# Patient Record
Sex: MALE | Race: WHITE | URBAN - METROPOLITAN AREA
[De-identification: names, ages, dates, MRNs, and addresses within clinical notes are randomized per-mention and may not be internally consistent; named-entity substitution may affect disease eponyms.]

---

## 2017-01-06 ENCOUNTER — OFFICE VISIT (OUTPATIENT)
Dept: PAIN MANAGEMENT | Age: 67
End: 2017-01-06

## 2017-01-06 VITALS — DIASTOLIC BLOOD PRESSURE: 52 MMHG | SYSTOLIC BLOOD PRESSURE: 81 MMHG | HEART RATE: 62 BPM | RESPIRATION RATE: 17 BRPM

## 2017-01-06 DIAGNOSIS — S32.009A CLOSED FRACTURE OF LUMBAR VERTEBRA, UNSPECIFIED FRACTURE MORPHOLOGY, INITIAL ENCOUNTER (HCC): ICD-10-CM

## 2017-01-06 DIAGNOSIS — M25.50 POLYARTHRALGIA: ICD-10-CM

## 2017-01-06 DIAGNOSIS — G89.29 CHRONIC BILATERAL LOW BACK PAIN WITH SCIATICA, SCIATICA LATERALITY UNSPECIFIED: Primary | ICD-10-CM

## 2017-01-06 DIAGNOSIS — M54.40 CHRONIC BILATERAL LOW BACK PAIN WITH SCIATICA, SCIATICA LATERALITY UNSPECIFIED: Primary | ICD-10-CM

## 2017-01-06 DIAGNOSIS — M15.9 GENERALIZED OA: ICD-10-CM

## 2017-01-06 DIAGNOSIS — M79.2 NEUROPATHIC PAIN: ICD-10-CM

## 2017-01-06 RX ORDER — HYDROCODONE BITARTRATE AND ACETAMINOPHEN 10; 325 MG/1; MG/1
.5-1 TABLET ORAL
Qty: 30 TAB | Refills: 0 | Status: SHIPPED | OUTPATIENT
Start: 2017-02-24 | End: 2017-03-02 | Stop reason: SDUPTHER

## 2017-01-06 RX ORDER — HYDROCODONE BITARTRATE AND ACETAMINOPHEN 10; 325 MG/1; MG/1
.5-1 TABLET ORAL
Qty: 30 TAB | Refills: 0 | Status: SHIPPED | OUTPATIENT
Start: 2017-01-06 | End: 2017-01-06 | Stop reason: SDUPTHER

## 2017-01-06 NOTE — PATIENT INSTRUCTIONS
1. Continue current plan with no evidence of addiction or diversion. Stable on current medication without adverse events. 2. Refill hydrocodone 10/325 mg. Take 1/2 to 1 tablet once daily as needed for pain. Currently has unfilled prescription dated to fill 12/29. He will have this filled on 1/25. New prescription written for 2/24  3. Continue tramadol 50 mg. Take 1-2 tablets up to 3 times daily as needed for pain. Currently has 2 refills left  4. Discussed risks of addiction, dependency, and opioid induced hyperalgesia.    5. Return to clinic in 2 months

## 2017-01-06 NOTE — MR AVS SNAPSHOT
Visit Information Date & Time Provider Department Dept. Phone Encounter #  
 1/6/2017  8:20 AM [facility] [phone] [id number] Follow-up Instructions Return in about 2 months (around 3/6/2017). Upcoming Health Maintenance Date Due Hepatitis C Screening 1950 DTaP/Tdap/Td series (1 - Tdap) 7/10/1970 FOBT Q 1 YEAR AGE 50-75 7/10/1999 ZOSTER VACCINE AGE 60> 7/10/2009 GLAUCOMA SCREENING Q2Y 7/10/2014 Pneumococcal 65+ Low/Medium Risk (1 of 2 - PCV13) 7/10/2014 MEDICARE YEARLY EXAM 7/10/2014 INFLUENZA AGE 9 TO ADULT 8/1/2016 Allergies as of 1/6/2017  Review Complete On: 1/6/2017 By: VENKATESH Gibbons No Known Allergies Current Immunizations  Never Reviewed No immunizations on file. Not reviewed this visit You Were Diagnosed With   
  
 Codes Comments Chronic bilateral low back pain with sciatica, sciatica laterality unspecified    -  Primary ICD-10-CM: M54.40, G89.29 ICD-9-CM: 724.2, 724.3, 338.29 Neuropathic pain     ICD-10-CM: M79.2 ICD-9-CM: 729.2 Polyarthralgia     ICD-10-CM: M25.50 ICD-9-CM: 719.49 Closed fracture of lumbar vertebra, unspecified fracture morphology, initial encounter ([facility]     ICD-10-CM: Z67.945Y ICD-9-CM: 805.4 Generalized OA     ICD-10-CM: M15.9 ICD-9-CM: 715.00 Vitals BP Pulse Resp Smoking Status (!) 81/52 62 17 Current Every Day Smoker Vitals History Preferred Pharmacy Pharmacy Name Phone [facility] [address], [address] [phone] Your Updated Medication List  
  
   
This list is accurate as of: 1/6/17  8:58 AM.  Always use your most recent med list.  
  
  
  
  
 amitriptyline 50 mg tablet Commonly known as:  ELAVIL Take  by mouth nightly. aspirin 81 mg chewable tablet Take 81 mg by mouth daily. cyanocobalamin 500 mcg tablet Commonly known as:  VITAMIN B12 Take 500 mcg by mouth daily. diclofenac 1 % Gel Commonly known as:  VOLTAREN Apply  to affected area four (4) times daily. gabapentin 100 mg capsule Commonly known as:  NEURONTIN Take  by mouth three (3) times daily. HYDROcodone-acetaminophen  mg tablet Commonly known as:  Kemal Barnett Take 0.5-1 Tabs by mouth daily as needed for Pain for up to 30 days. Start taking on:  2/24/2017  
  
 ibuprofen 800 mg tablet Commonly known as:  MOTRIN Take  by mouth every eight (8) hours as needed for Pain. lisinopril 10 mg tablet Commonly known as:  Marly Shames Take  by mouth daily. omeprazole 20 mg capsule Commonly known as:  PRILOSEC Take 20 mg by mouth daily. * OTHER  
DDS Lumbar Traction Belt * OTHER Safe Step walk-in tub TENS Units Nehal Galvan Use as directed  
  
 tiZANidine 4 mg capsule Commonly known as:  Kassandra Manual Take 4 mg by mouth two (2) times a day. * traMADol 50 mg tablet Commonly known as:  ULTRAM  
Take 50 mg by mouth two (2) times a day. * traMADol 50 mg tablet Commonly known as:  ULTRAM  
Take 1-2 Tabs by mouth three (3) times daily as needed for Pain for up to 30 days. Max Daily Amount: 300 mg. Indications: NEUROPATHIC PAIN, PAIN  
  
 * Notice: This list has 4 medication(s) that are the same as other medications prescribed for you. Read the directions carefully, and ask your doctor or other care provider to review them with you. Prescriptions Printed Refills HYDROcodone-acetaminophen (NORCO)  mg tablet 0 Starting on: 2/24/2017 Sig: Take 0.5-1 Tabs by mouth daily as needed for Pain for up to 30 days. Class: Print Route: Oral  
  
Follow-up Instructions Return in about 2 months (around 3/6/2017). Patient Instructions 1. Continue current plan with no evidence of addiction or diversion. Stable on current medication without adverse events. 2. Refill hydrocodone 10/325 mg. Take 1/2 to 1 tablet once daily as needed for pain. Currently has unfilled prescription dated to fill 12/29. He will have this filled on 1/25. New prescription written for 2/24 3. Continue tramadol 50 mg. Take 12 tablets up to 3 times daily as needed for pain. Currently has 2 refills left 4. Discussed risks of addiction, dependency, and opioid induced hyperalgesia. 5. Return to clinic in 2 months Introducing [facility]! Dilcia Aldridge introduces [employer] patient portal. Now you can access parts of your medical record, email your doctor's office, and request medication refills online. 1. In your internet browser, go to [email or web address] 2. Click on the First Time User? Click Here link in the Sign In box. You will see the New Member Sign Up page. 3. Enter your [employer] Access Code exactly as it appears below. You will not need to use this code after youve completed the sign-up process. If you do not sign up before the expiration date, you must request a new code. · [employer] App Access Code: [email or web address] Expires: 4/6/2017  8:58 AM 
 
4. Enter the last four digits of your Social Security Number (xxxx) and Date of Birth (mm/dd/yyyy) as indicated and click Submit. You will be taken to the next sign-up page. 5. Create a [employer] ID. This will be your [employer] login ID and cannot be changed, so think of one that is secure and easy to remember. 6. Create a [employer] password. You can change your password at any time. 7. Enter your Password Reset Question and Answer. This can be used at a later time if you forget your password. 8. Enter your e-mail address. You will receive e-mail notification when new information is available in [address] 9. Click Sign Up. You can now view and download portions of your medical record. 10. Click the Download Summary menu link to download a portable copy of your medical information. If you have questions, please visit the Frequently Asked Questions section of the [employer] website. Remember, [employer] is NOT to be used for urgent needs. For medical emergencies, dial 911. Now available from your iPhone and Android! Please provide this summary of care documentation to your next provider. If you have any questions after today's visit, please call [phone].

## 2017-01-06 NOTE — PROGRESS NOTES
Nursing Notes    Patient presents to the office today in follow-up. Reviewed medications with counts as follows:    Rx Date filled Qty Dispensed Pill Count Last Dose Short   Tramadol 50 mg 12/6/16 180 18 This am No. 2 refills remaining   norco 10/325 12/6/16 30 20 unknown no   Mr. Jaren Horn has a reminder for a \"due or due soon\" health maintenance. I have asked that he contact his primary care provider for follow-up on this health maintenance. POC UDS was not performed in office today    Any new labs or imaging since last appointment? NO    Have you been to an emergency room (ER) or urgent care clinic since your last visit? NO            Have you been hospitalized since your last visit? NO     If yes, where, when, and reason for visit? Have you seen or consulted any other health care providers outside of the [facility]  since your last visit?   YES     If yes, where, when, and reason for visit?   pcp
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
## 2017-03-02 ENCOUNTER — OFFICE VISIT (OUTPATIENT)
Dept: PAIN MANAGEMENT | Age: 67
End: 2017-03-02

## 2017-03-02 VITALS
HEIGHT: 73 IN | BODY MASS INDEX: 37.91 KG/M2 | DIASTOLIC BLOOD PRESSURE: 73 MMHG | WEIGHT: 286 LBS | HEART RATE: 68 BPM | SYSTOLIC BLOOD PRESSURE: 120 MMHG

## 2017-03-02 DIAGNOSIS — M15.9 GENERALIZED OA: ICD-10-CM

## 2017-03-02 DIAGNOSIS — M79.2 NEUROPATHIC PAIN: ICD-10-CM

## 2017-03-02 DIAGNOSIS — M25.50 POLYARTHRALGIA: ICD-10-CM

## 2017-03-02 DIAGNOSIS — M54.40 CHRONIC BILATERAL LOW BACK PAIN WITH SCIATICA, SCIATICA LATERALITY UNSPECIFIED: ICD-10-CM

## 2017-03-02 DIAGNOSIS — G89.29 CHRONIC BILATERAL LOW BACK PAIN WITH SCIATICA, SCIATICA LATERALITY UNSPECIFIED: ICD-10-CM

## 2017-03-02 RX ORDER — HYDROCODONE BITARTRATE AND ACETAMINOPHEN 10; 325 MG/1; MG/1
.5-1 TABLET ORAL
Qty: 30 TAB | Refills: 0 | Status: SHIPPED | OUTPATIENT
Start: 2017-04-08 | End: 2017-04-27 | Stop reason: SDUPTHER

## 2017-03-02 RX ORDER — TRAMADOL HYDROCHLORIDE 50 MG/1
50-100 TABLET ORAL
Qty: 180 TAB | Refills: 2 | Status: SHIPPED | OUTPATIENT
Start: 2017-03-08 | End: 2017-04-27 | Stop reason: SDUPTHER

## 2017-03-02 RX ORDER — HYDROCODONE BITARTRATE AND ACETAMINOPHEN 10; 325 MG/1; MG/1
.5-1 TABLET ORAL
Qty: 30 TAB | Refills: 0 | Status: SHIPPED | OUTPATIENT
Start: 2017-03-09 | End: 2017-03-02 | Stop reason: SDUPTHER

## 2017-03-02 RX ORDER — HYDROCODONE BITARTRATE AND ACETAMINOPHEN 10; 325 MG/1; MG/1
.5-1 TABLET ORAL
Qty: 30 TAB | Refills: 0 | Status: SHIPPED | OUTPATIENT
Start: 2017-04-08 | End: 2017-03-02 | Stop reason: CLARIF
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
## 2017-03-02 NOTE — PROGRESS NOTES
Nursing Notes    Patient presents to the office today in follow-up. Patient rates his pain at 3/10 on the numerical pain scale. Reviewed medications with counts as follows:    Rx Date filled Qty Dispensed Pill Count Last Dose Short   Tramadol 50 mg 02/06/17 180 79 today no   norco 10/325 mg - pt has one unfilled prescription 02/07/17 30 16 2 days ago no                             POC UDS was not performed in office today. Any new labs or imaging since last appointment? NO    Have you been to an emergency room (ER) or urgent care clinic since your last visit? NO            Have you been hospitalized since your last visit? NO     If yes, where, when, and reason for visit? Have you seen or consulted any other health care providers outside of the [facility]  since your last visit? NO    If yes, where, when, and reason for visit? HM deferred to pcp.

## 2017-03-02 NOTE — MR AVS SNAPSHOT
Visit Information Date & Time Provider Department Dept. Phone Encounter #  
 3/2/2017  8:00 AM Amanda Perez, [address] for Pain Management [phone] [id number] Follow-up Instructions Return in about 2 months (around 5/2/2017). Upcoming Health Maintenance Date Due Hepatitis C Screening 1950 DTaP/Tdap/Td series (1 - Tdap) 7/10/1970 FOBT Q 1 YEAR AGE 50-75 7/10/1999 ZOSTER VACCINE AGE 60> 7/10/2009 GLAUCOMA SCREENING Q2Y 7/10/2014 Pneumococcal 65+ Low/Medium Risk (1 of 2 - PCV13) 7/10/2014 MEDICARE YEARLY EXAM 7/10/2014 INFLUENZA AGE 9 TO ADULT 8/1/2016 Allergies as of 3/2/2017  Review Complete On: 3/2/2017 By: VENKATESH Regalado No Known Allergies Current Immunizations  Never Reviewed No immunizations on file. Not reviewed this visit Vitals BP  
  
  
  
  
  
 120/73 BMI and BSA Data Body Mass Index Body Surface Area  
 37.73 kg/m 2 2.58 m 2 Preferred Pharmacy Pharmacy Name Phone Yesenia Knight [address], [address] [place] [address] Road [phone] Your Updated Medication List  
  
   
This list is accurate as of: 3/2/17  8:31 AM.  Always use your most recent med list.  
  
  
  
  
 amitriptyline 50 mg tablet Commonly known as:  ELAVIL Take  by mouth nightly. aspirin 81 mg chewable tablet Take 81 mg by mouth daily. cyanocobalamin 500 mcg tablet Commonly known as:  VITAMIN B12 Take 500 mcg by mouth daily. diclofenac 1 % Gel Commonly known as:  VOLTAREN Apply  to affected area four (4) times daily. gabapentin 100 mg capsule Commonly known as:  NEURONTIN Take  by mouth three (3) times daily. HYDROcodone-acetaminophen  mg tablet Commonly known as:  Rolinda Doles Take 0.5-1 Tabs by mouth daily as needed for Pain for up to 30 days. Start taking on:  4/8/2017  
  
 ibuprofen 800 mg tablet Commonly known as:  MOTRIN Take  by mouth every eight (8) hours as needed for Pain. lisinopril 10 mg tablet Commonly known as:  Jonna Jackson Take  by mouth daily. omeprazole 20 mg capsule Commonly known as:  PRILOSEC Take 20 mg by mouth daily. * OTHER  
DDS Lumbar Traction Belt * OTHER Safe Step walk-in tub TENS Units [employer] Safe Use as directed  
  
 tiZANidine 4 mg capsule Commonly known as:  Mohsen Laws Take 4 mg by mouth two (2) times a day. * traMADol 50 mg tablet Commonly known as:  ULTRAM  
Take 50 mg by mouth two (2) times a day. * traMADol 50 mg tablet Commonly known as:  ULTRAM  
Take 1-2 Tabs by mouth three (3) times daily as needed for Pain for up to 30 days. Max Daily Amount: 300 mg. Indications: NEUROPATHIC PAIN, Pain Start taking on:  3/8/2017 * Notice: This list has 4 medication(s) that are the same as other medications prescribed for you. Read the directions carefully, and ask your doctor or other care provider to review them with you. Prescriptions Printed Refills  
 traMADol (ULTRAM) 50 mg tablet 2 Starting on: 3/8/2017 Sig: Take 1-2 Tabs by mouth three (3) times daily as needed for Pain for up to 30 days. Max Daily Amount: 300 mg. Indications: NEUROPATHIC PAIN, Pain Class: Print Route: Oral  
 HYDROcodone-acetaminophen (NORCO)  mg tablet 0 Starting on: 4/8/2017 Sig: Take 0.5-1 Tabs by mouth daily as needed for Pain for up to 30 days. Class: Print Route: Oral  
  
Follow-up Instructions Return in about 2 months (around 5/2/2017). Patient Instructions 1. Continue current plan with no evidence of addiction or diversion. Stable on current medication without adverse events. 2. Refill hydrocodone 10/325 mg. Take 1/2 to 1 tablet once daily as needed for pain. Currently has unfilled prescription dated to fill 2/24. He will have this filled on 3/9. New prescription written for 4/8 3. Refill tramadol 50 mg  12 tablets up to 3 times daily as needed for pain. 2 refills 4. Discussed risks of addiction, dependency, and opioid induced hyperalgesia. 5. Return to clinic in 2 months Introducing \[facility]\"" & HEALTH SERVICES! Dio Hamlin introduces [employer] patient portal. Now you can access parts of your medical record, email your doctor's office, and request medication refills online. 1. In your internet browser, go to [email or web address] 2. Click on the First Time User? Click Here link in the Sign In box. You will see the New Member Sign Up page. 3. Enter your [employer] Access Code exactly as it appears below. You will not need to use this code after youve completed the sign-up process. If you do not sign up before the expiration date, you must request a new code. · [employer] Access Code: [email or web address] Expires: 4/6/2017  8:58 AM 
 
4. Enter the last four digits of your Social Security Number (xxxx) and Date of Birth (mm/dd/yyyy) as indicated and click Submit. You will be taken to the next sign-up page. 5. Create a [employer] ID. This will be your [employer] login ID and cannot be changed, so think of one that is secure and easy to remember. 6. Create a [employer] password. You can change your password at any time. 7. Enter your Password Reset Question and Answer. This can be used at a later time if you forget your password. 8. Enter your e-mail address. You will receive e-mail notification when new information is available in [address] 9. Click Sign Up. You can now view and download portions of your medical record. 10. Click the Download Summary menu link to download a portable copy of your medical information. If you have questions, please visit the Frequently Asked Questions section of the [employer] website. Remember, [employer] is NOT to be used for urgent needs. For medical emergencies, dial 911. Now available from your iPhone and Android! Please provide this summary of care documentation to your next provider. If you have any questions after today's visit, please call [phone].

## 2017-03-02 NOTE — PATIENT INSTRUCTIONS
1. Continue current plan with no evidence of addiction or diversion. Stable on current medication without adverse events. 2. Refill hydrocodone 10/325 mg. Take 1/2 to 1 tablet once daily as needed for pain. Currently has unfilled prescription dated to fill 2/24. He will have this filled on 3/9. New prescription written for 4/8  3. Refill tramadol 50 mg  1-2 tablets up to 3 times daily as needed for pain. 2 refills  4. Discussed risks of addiction, dependency, and opioid induced hyperalgesia.    5. Return to clinic in 2 months

## 2017-04-27 ENCOUNTER — OFFICE VISIT (OUTPATIENT)
Dept: PAIN MANAGEMENT | Age: 67
End: 2017-04-27

## 2017-04-27 VITALS
DIASTOLIC BLOOD PRESSURE: 78 MMHG | HEIGHT: 73 IN | WEIGHT: 286 LBS | SYSTOLIC BLOOD PRESSURE: 118 MMHG | HEART RATE: 69 BPM | BODY MASS INDEX: 37.91 KG/M2

## 2017-04-27 DIAGNOSIS — M25.50 POLYARTHRALGIA: Primary | ICD-10-CM

## 2017-04-27 DIAGNOSIS — M15.9 GENERALIZED OA: ICD-10-CM

## 2017-04-27 DIAGNOSIS — G89.29 CHRONIC BILATERAL LOW BACK PAIN WITH SCIATICA, SCIATICA LATERALITY UNSPECIFIED: ICD-10-CM

## 2017-04-27 DIAGNOSIS — M54.40 CHRONIC BILATERAL LOW BACK PAIN WITH SCIATICA, SCIATICA LATERALITY UNSPECIFIED: ICD-10-CM

## 2017-04-27 DIAGNOSIS — M79.2 NEUROPATHIC PAIN: ICD-10-CM

## 2017-04-27 RX ORDER — HYDROCODONE BITARTRATE AND ACETAMINOPHEN 10; 325 MG/1; MG/1
.5-1 TABLET ORAL
Qty: 30 TAB | Refills: 0 | Status: SHIPPED | OUTPATIENT
Start: 2017-05-08 | End: 2017-08-14 | Stop reason: SDUPTHER

## 2017-04-27 RX ORDER — HYDROCODONE BITARTRATE AND ACETAMINOPHEN 10; 325 MG/1; MG/1
.5-1 TABLET ORAL
Qty: 30 TAB | Refills: 0 | Status: SHIPPED | OUTPATIENT
Start: 2017-06-07 | End: 2017-08-14 | Stop reason: SDUPTHER

## 2017-04-27 RX ORDER — TRAMADOL HYDROCHLORIDE 50 MG/1
50-100 TABLET ORAL
Qty: 180 TAB | Refills: 3 | Status: SHIPPED | OUTPATIENT
Start: 2017-06-10 | End: 2017-08-14 | Stop reason: SDUPTHER

## 2017-04-27 RX ORDER — HYDROCODONE BITARTRATE AND ACETAMINOPHEN 10; 325 MG/1; MG/1
.5-1 TABLET ORAL
Qty: 30 TAB | Refills: 0 | Status: SHIPPED | OUTPATIENT
Start: 2017-07-07 | End: 2017-11-08 | Stop reason: SDUPTHER

## 2017-04-27 NOTE — PATIENT INSTRUCTIONS
1. Continue current plan with no evidence of addiction or diversion. Stable on current medication without adverse events. 2. Refill hydrocodone 10/325 mg. Take 1/2 to 1 tablet once daily as needed for pain. Currently has unfilled prescription dated to fill 4/8.    3. Refill tramadol 50 mg  1-2 tablets up to 3 times daily as needed for pain. 3 refills  4. Discussed risks of addiction, dependency, and opioid induced hyperalgesia.    5. Return to clinic in 3 months

## 2017-04-27 NOTE — PROGRESS NOTES
Nursing Notes    Patient presents to the office today in follow-up. Patient rates his pain at 3/10 on the numerical pain scale. Reviewed medications with counts as follows:    Rx Date filled Qty Dispensed Pill Count Last Dose Short   norco 10/325 mg - pt has one unfilled prescription 03/10/17 30 6 Couple of days ago no   Tramadol 50 mg - one refill on bottle 04/10/17 180 116 today no                             POC UDS was not performed in office today    Any new labs or imaging since last appointment? YES    Have you been to an emergency room (ER) or urgent care clinic since your last visit? NO            Have you been hospitalized since your last visit? YES     If yes, where, when, and reason for visit? Pt had a colonoscopy    Have you seen or consulted any other health care providers outside of the [address] [place] [address]  since your last visit? YES     If yes, where, when, and reason for visit? pcp    HM deferred to pcp.

## 2017-04-27 NOTE — MR AVS SNAPSHOT
Visit Information Date & Time Provider Department Dept. Phone Encounter #  
 4/27/2017  8:00 AM Sheldon Masters, [address] [place] [address] for Pain Management [phone] Follow-up Instructions Return in about 3 months (around 7/27/2017). Upcoming Health Maintenance Date Due Hepatitis C Screening 1950 DTaP/Tdap/Td series (1 - Tdap) 7/10/1970 FOBT Q 1 YEAR AGE 50-75 7/10/1999 ZOSTER VACCINE AGE 60> 7/10/2009 GLAUCOMA SCREENING Q2Y 7/10/2014 Pneumococcal 65+ Low/Medium Risk (1 of 2 - PCV13) 7/10/2014 MEDICARE YEARLY EXAM 7/10/2014 INFLUENZA AGE 9 TO ADULT 8/1/2016 Allergies as of 4/27/2017  Review Complete On: 4/27/2017 By: VENKATESH Bean No Known Allergies Current Immunizations  Never Reviewed No immunizations on file. Not reviewed this visit You Were Diagnosed With   
  
 Codes Comments Polyarthralgia    -  Primary ICD-10-CM: M25.50 ICD-9-CM: 719.49 Neuropathic pain     ICD-10-CM: M79.2 ICD-9-CM: 729.2 Generalized OA     ICD-10-CM: M15.9 ICD-9-CM: 715.00 Chronic bilateral low back pain with sciatica, sciatica laterality unspecified     ICD-10-CM: M54.40, G89.29 ICD-9-CM: 724.2, 724.3, 338.29 Vitals BP Pulse Height(growth percentile) Weight(growth percentile) BMI Smoking Status 118/78 69 6' 1\" (1.854 m) 286 lb (129.7 kg) 37.73 kg/m2 Current Every Day Smoker BMI and BSA Data Body Mass Index Body Surface Area  
 37.73 kg/m 2 2.58 m 2 Preferred Pharmacy Pharmacy Name Phone [facility] [address], [address] [phone] Your Updated Medication List  
  
   
This list is accurate as of: 4/27/17  8:21 AM.  Always use your most recent med list.  
  
  
  
  
 amitriptyline 50 mg tablet Commonly known as:  ELAVIL Take  by mouth nightly. aspirin 81 mg chewable tablet Take 81 mg by mouth daily. cyanocobalamin 500 mcg tablet Commonly known as:  VITAMIN B12 Take 500 mcg by mouth daily. diclofenac 1 % Gel Commonly known as:  VOLTAREN Apply  to affected area four (4) times daily. gabapentin 100 mg capsule Commonly known as:  NEURONTIN Take  by mouth three (3) times daily. * HYDROcodone-acetaminophen  mg tablet Commonly known as:  [address] Take 0.5-1 Tabs by mouth daily as needed for Pain for up to 30 days. Start taking on:  5/8/2017  
  
 * HYDROcodone-acetaminophen  mg tablet Commonly known as:  [address] Jose Take 0.5-1 Tabs by mouth daily as needed for Pain for up to 30 days. Start taking on:  6/7/2017  
  
 * HYDROcodone-acetaminophen  mg tablet Commonly known as:  [address] Jose Take 0.5-1 Tabs by mouth daily as needed for Pain for up to 30 days. Start taking on:  7/7/2017  
  
 ibuprofen 800 mg tablet Commonly known as:  MOTRIN Take  by mouth every eight (8) hours as needed for Pain. lisinopril 10 mg tablet Commonly known as:  Sydna Lies Take  by mouth daily. omeprazole 20 mg capsule Commonly known as:  PRILOSEC Take 20 mg by mouth daily. * OTHER  
DDS Lumbar Traction Belt * OTHER Safe Step walk-in tub TENS Units [employer] Use as directed  
  
 tiZANidine 4 mg capsule Commonly known as:  Pasty Dao Take 4 mg by mouth two (2) times a day. * traMADol 50 mg tablet Commonly known as:  ULTRAM  
Take 50 mg by mouth two (2) times a day. * traMADol 50 mg tablet Commonly known as:  ULTRAM  
Take 1-2 Tabs by mouth three (3) times daily as needed for Pain for up to 30 days. Max Daily Amount: 300 mg. Indications: NEUROPATHIC PAIN, Pain Start taking on:  6/10/2017 * Notice: This list has 7 medication(s) that are the same as other medications prescribed for you. Read the directions carefully, and ask your doctor or other care provider to review them with you. Prescriptions Printed Refills HYDROcodone-acetaminophen (NORCO)  mg tablet 0 Starting on: 5/8/2017 Sig: Take 0.5-1 Tabs by mouth daily as needed for Pain for up to 30 days. Class: Print Route: Oral  
 HYDROcodone-acetaminophen (NORCO)  mg tablet 0 Starting on: 6/7/2017 Sig: Take 0.5-1 Tabs by mouth daily as needed for Pain for up to 30 days. Class: Print Route: Oral  
 HYDROcodone-acetaminophen (NORCO)  mg tablet 0 Starting on: 7/7/2017 Sig: Take 0.5-1 Tabs by mouth daily as needed for Pain for up to 30 days. Class: Print Route: Oral  
 traMADol (ULTRAM) 50 mg tablet 3 Starting on: 6/10/2017 Sig: Take 1-2 Tabs by mouth three (3) times daily as needed for Pain for up to 30 days. Max Daily Amount: 300 mg. Indications: NEUROPATHIC PAIN, Pain Class: Print Route: Oral  
  
Follow-up Instructions Return in about 3 months (around 7/27/2017). Patient Instructions 1. Continue current plan with no evidence of addiction or diversion. Stable on current medication without adverse events. 2. Refill hydrocodone 10/325 mg. Take 1/2 to 1 tablet once daily as needed for pain. Currently has unfilled prescription dated to fill 4/8.   
3. Refill tramadol 50 mg  12 tablets up to 3 times daily as needed for pain. 3 refills 4. Discussed risks of addiction, dependency, and opioid induced hyperalgesia. 5. Return to clinic in 3 months Introducing [facility] & HEALTH SERVICES! Wicho Eagle introduces [employer] patient portal. Now you can access parts of your medical record, email your doctor's office, and request medication refills online. 1. In your internet browser, go to [email or web address] 2. Click on the First Time User? Click Here link in the Sign In box. You will see the New Member Sign Up page. 3. Enter your [employer] Access Code exactly as it appears below. You will not need to use this code after youve completed the sign-up process.  If you do not sign up before the expiration date, you must request a new code. · [employer] Access Code: [email or web address] Expires: 7/26/2017  8:21 AM 
 
4. Enter the last four digits of your Social Security Number (xxxx) and Date of Birth (mm/dd/yyyy) as indicated and click Submit. You will be taken to the next sign-up page. 5. Create a [employer] ID. This will be your [employer] login ID and cannot be changed, so think of one that is secure and easy to remember. 6. Create a [employer] password. You can change your password at any time. 7. Enter your Password Reset Question and Answer. This can be used at a later time if you forget your password. 8. Enter your e-mail address. You will receive e-mail notification when new information is available in [address] 9. Click Sign Up. You can now view and download portions of your medical record. 10. Click the Download Summary menu link to download a portable copy of your medical information. If you have questions, please visit the Frequently Asked Questions section of the [employer] website. Remember, [employer] is NOT to be used for urgent needs. For medical emergencies, dial 911. Now available from your iPhone and Android! Please provide this summary of care documentation to your next provider. If you have any questions after today's visit, please call [phone].
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
## 2017-08-14 ENCOUNTER — OFFICE VISIT (OUTPATIENT)
Dept: PAIN MANAGEMENT | Age: 67
End: 2017-08-14

## 2017-08-14 VITALS
SYSTOLIC BLOOD PRESSURE: 145 MMHG | HEART RATE: 70 BPM | BODY MASS INDEX: 37.73 KG/M2 | RESPIRATION RATE: 20 BRPM | DIASTOLIC BLOOD PRESSURE: 89 MMHG | WEIGHT: 286 LBS | TEMPERATURE: 97.6 F

## 2017-08-14 DIAGNOSIS — M79.2 NEUROPATHIC PAIN: ICD-10-CM

## 2017-08-14 DIAGNOSIS — Z79.899 ENCOUNTER FOR LONG-TERM (CURRENT) USE OF HIGH-RISK MEDICATION: Primary | ICD-10-CM

## 2017-08-14 DIAGNOSIS — M54.40 CHRONIC BILATERAL LOW BACK PAIN WITH SCIATICA, SCIATICA LATERALITY UNSPECIFIED: ICD-10-CM

## 2017-08-14 DIAGNOSIS — M25.50 POLYARTHRALGIA: ICD-10-CM

## 2017-08-14 DIAGNOSIS — G89.29 CHRONIC BILATERAL LOW BACK PAIN WITH SCIATICA, SCIATICA LATERALITY UNSPECIFIED: ICD-10-CM

## 2017-08-14 LAB
ALCOHOL UR POC: NORMAL
AMPHETAMINES UR POC: NEGATIVE
BARBITURATES UR POC: NEGATIVE
BENZODIAZEPINES UR POC: NEGATIVE
BUPRENORPHINE UR POC: NORMAL
CANNABINOIDS UR POC: NEGATIVE
CARISOPRODOL UR POC: NORMAL
COCAINE UR POC: NEGATIVE
FENTANYL UR POC: NORMAL
MDMA/ECSTASY UR POC: NEGATIVE
METHADONE UR POC: NEGATIVE
METHAMPHETAMINE UR POC: NEGATIVE
METHYLPHENIDATE UR POC: NEGATIVE
OPIATES UR POC: NORMAL
OXYCODONE UR POC: NEGATIVE
PHENCYCLIDINE UR POC: NORMAL
PROPOXYPHENE UR POC: NORMAL
TRAMADOL UR POC: NORMAL
TRICYCLICS UR POC: NEGATIVE

## 2017-08-14 RX ORDER — TRAMADOL HYDROCHLORIDE 50 MG/1
50-100 TABLET ORAL
Qty: 180 TAB | Refills: 1 | Status: SHIPPED | OUTPATIENT
Start: 2017-10-10 | End: 2017-11-08 | Stop reason: SDUPTHER

## 2017-08-14 RX ORDER — HYDROCODONE BITARTRATE AND ACETAMINOPHEN 10; 325 MG/1; MG/1
.5-1 TABLET ORAL
Qty: 30 TAB | Refills: 0 | Status: SHIPPED | OUTPATIENT
Start: 2017-09-19 | End: 2017-11-08 | Stop reason: SDUPTHER

## 2017-08-14 RX ORDER — HYDROCODONE BITARTRATE AND ACETAMINOPHEN 10; 325 MG/1; MG/1
.5-1 TABLET ORAL
Qty: 30 TAB | Refills: 0 | Status: SHIPPED | OUTPATIENT
Start: 2017-08-20 | End: 2017-11-08 | Stop reason: SDUPTHER

## 2017-08-14 NOTE — PROGRESS NOTES
Nursing Notes    Patient presents to the office today in follow-up. Patient rates his pain at 6/10 on the numerical pain scale. Reviewed medications with counts as follows:    Rx Date filled Qty Dispensed Pill Count Last Dose Short   norco 10/325mg  07/19/17 30 7 Yesterday  No    Tramadol 50mg  08/12/17 180 188 This am  No                                   Comments: patient noted with one unused prescription for norco 10/325mg (#30) dated 04/27/17 and due to fill 07/07/17. POC UDS was performed in office today    Any new labs or imaging since last appointment? YES; labwork for pcp ; and x-ray right knee     Have you been to an emergency room (ER) or urgent care clinic since your last visit? NO            Have you been hospitalized si/nce your last visit? NO     If yes, where, when, and reason for visit? Have you seen or consulted any other health care providers outside of the [address] [place] [address]  since your last visit? YES     If yes, where, when, and reason for visit? pcp , orthopedist         HM deferred to pcp.

## 2017-08-14 NOTE — MR AVS SNAPSHOT
Visit Information Date & Time Provider Department Dept. Phone Encounter #  
 8/14/2017  8:20 AM [facility] [phone] [id number] Follow-up Instructions Return in about 3 months (around 11/14/2017). Upcoming Health Maintenance Date Due Hepatitis C Screening 1950 DTaP/Tdap/Td series (1 - Tdap) 7/10/1970 FOBT Q 1 YEAR AGE 50-75 7/10/1999 ZOSTER VACCINE AGE 60> 5/10/2009 GLAUCOMA SCREENING Q2Y 7/10/2014 Pneumococcal 65+ Low/Medium Risk (1 of 2 - PCV13) 7/10/2014 MEDICARE YEARLY EXAM 7/10/2014 INFLUENZA AGE 9 TO ADULT 8/1/2017 Allergies as of 8/14/2017  Review Complete On: 8/14/2017 By: VENKATESH Rojas No Known Allergies Current Immunizations  Never Reviewed No immunizations on file. Not reviewed this visit You Were Diagnosed With   
  
 Codes Comments Encounter for long-term (current) use of high-risk medication    -  Primary ICD-10-CM: T09.314 ICD-9-CM: V58.69 Chronic bilateral low back pain with sciatica, sciatica laterality unspecified     ICD-10-CM: M54.40, G89.29 ICD-9-CM: 724.2, 724.3, 338.29 Neuropathic pain     ICD-10-CM: M79.2 ICD-9-CM: 729.2 Polyarthralgia     ICD-10-CM: M25.50 ICD-9-CM: 719.49 Vitals BP Pulse Temp Resp Weight(growth percentile) BMI  
 145/89 (BP 1 Location: Left arm, BP Patient Position: Sitting) 70 97.6 °F (36.4 °C) (Oral) 20 286 lb (129.7 kg) 37.73 kg/m2 Smoking Status Current Every Day Smoker Vitals History BMI and BSA Data Body Mass Index Body Surface Area  
 37.73 kg/m 2 2.58 m 2 Preferred Pharmacy Pharmacy Name Phone ALICIA AGOSTO [facility] [address], [address] [phone] Your Updated Medication List  
  
   
This list is accurate as of: 8/14/17  9:06 AM.  Always use your most recent med list.  
  
  
  
  
 amitriptyline 50 mg tablet Commonly known as:  ELAVIL Take  by mouth nightly. aspirin 81 mg chewable tablet Take 81 mg by mouth daily. cyanocobalamin 500 mcg tablet Commonly known as:  VITAMIN B12 Take 500 mcg by mouth daily. diclofenac 1 % Gel Commonly known as:  VOLTAREN Apply  to affected area four (4) times daily. gabapentin 100 mg capsule Commonly known as:  NEURONTIN Take  by mouth three (3) times daily. * HYDROcodone-acetaminophen  mg tablet Commonly known as:  Karol Holcomb Take 0.5-1 Tabs by mouth daily as needed for Pain for up to 30 days. Start taking on:  8/20/2017  
  
 * HYDROcodone-acetaminophen  mg tablet Commonly known as:  Karol Holcomb Take 0.5-1 Tabs by mouth daily as needed for Pain for up to 30 days. Start taking on:  9/19/2017  
  
 ibuprofen 800 mg tablet Commonly known as:  MOTRIN Take  by mouth every eight (8) hours as needed for Pain. lisinopril 10 mg tablet Commonly known as:  Keke Jose Luis Take  by mouth daily. omeprazole 20 mg capsule Commonly known as:  PRILOSEC Take 20 mg by mouth daily. * OTHER  
DDS Lumbar Traction Belt * OTHER Safe Step walk-in tub TENS Units Cherelle Rabago Use as directed  
  
 tiZANidine 4 mg capsule Commonly known as:  Aleksey Reasons Take 4 mg by mouth two (2) times a day. * traMADol 50 mg tablet Commonly known as:  ULTRAM  
Take 50 mg by mouth two (2) times a day. * traMADol 50 mg tablet Commonly known as:  ULTRAM  
Take 1-2 Tabs by mouth three (3) times daily as needed for Pain for up to 30 days. Max Daily Amount: 300 mg. Indications: NEUROPATHIC PAIN, Pain Start taking on:  10/10/2017 * Notice: This list has 6 medication(s) that are the same as other medications prescribed for you. Read the directions carefully, and ask your doctor or other care provider to review them with you. Prescriptions Printed Refills HYDROcodone-acetaminophen (NORCO)  mg tablet 0 Starting on: 8/20/2017 Sig: Take 0.5-1 Tabs by mouth daily as needed for Pain for up to 30 days. Class: Print Route: Oral  
 HYDROcodone-acetaminophen (NORCO)  mg tablet 0 Starting on: 9/19/2017 Sig: Take 0.5-1 Tabs by mouth daily as needed for Pain for up to 30 days. Class: Print Route: Oral  
 traMADol (ULTRAM) 50 mg tablet 1 Starting on: 10/10/2017 Sig: Take 1-2 Tabs by mouth three (3) times daily as needed for Pain for up to 30 days. Max Daily Amount: 300 mg. Indications: NEUROPATHIC PAIN, Pain Class: Print Route: Oral  
  
We Performed the Following AMB POC DRUG SCREEN () [ [facility]] DRUG SCREEN [[id number] Custom] Follow-up Instructions Return in about 3 months (around 11/14/2017). Patient Instructions 1. Continue current plan with no evidence of addiction or diversion. Stable on current medication without adverse events. 2. Refill hydrocodone 10/325 mg. Take 1/2 to 1 tablet once daily as needed for pain. Currently has unfilled prescription dated to fill 4/8.   
3. Refill tramadol 50 mg  12 tablets up to 3 times daily as needed for pain. 3 refills 4. Discussed risks of addiction, dependency, and opioid induced hyperalgesia. 5. Return to clinic in 3 months Introducing [facility]! Carmen Goel introduces [employer] patient portal. Now you can access parts of your medical record, email your doctor's office, and request medication refills online. 1. In your internet browser, go to [email or web address] 2. Click on the First Time User? Click Here link in the Sign In box. You will see the New Member Sign Up page. 3. Enter your [employer] Access Code exactly as it appears below. You will not need to use this code after youve completed the sign-up process. If you do not sign up before the expiration date, you must request a new code. · WIB Access Code: [email or web address] Expires: 11/12/2017  9:06 AM 
 
4. Enter the last four digits of your Social Security Number (xxxx) and Date of Birth (mm/dd/yyyy) as indicated and click Submit. You will be taken to the next sign-up page. 5. Create a WIB ID. This will be your WIB login ID and cannot be changed, so think of one that is secure and easy to remember. 6. Create a WIB password. You can change your password at any time. 7. Enter your Password Reset Question and Answer. This can be used at a later time if you forget your password. 8. Enter your e-mail address. You will receive e-mail notification when new information is available in [address] 9. Click Sign Up. You can now view and download portions of your medical record. 10. Click the Download Summary menu link to download a portable copy of your medical information. If you have questions, please visit the Frequently Asked Questions section of the WIB website. Remember, WIB is NOT to be used for urgent needs. For medical emergencies, dial 911. Now available from your iPhone and Android! Please provide this summary of care documentation to your next provider. If you have any questions after today's visit, please call [phone].

## 2017-08-14 NOTE — ACP (ADVANCE CARE PLANNING)
Patient does not have current advanced directives and is not interesting in completing at this time.
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
## 2017-11-08 ENCOUNTER — OFFICE VISIT (OUTPATIENT)
Dept: PAIN MANAGEMENT | Age: 67
End: 2017-11-08

## 2017-11-08 VITALS
HEART RATE: 68 BPM | BODY MASS INDEX: 37.91 KG/M2 | DIASTOLIC BLOOD PRESSURE: 83 MMHG | TEMPERATURE: 97.2 F | SYSTOLIC BLOOD PRESSURE: 133 MMHG | WEIGHT: 286 LBS | HEIGHT: 73 IN | RESPIRATION RATE: 18 BRPM

## 2017-11-08 DIAGNOSIS — M79.2 NEUROPATHIC PAIN: ICD-10-CM

## 2017-11-08 DIAGNOSIS — M25.50 POLYARTHRALGIA: ICD-10-CM

## 2017-11-08 DIAGNOSIS — G89.29 CHRONIC BILATERAL LOW BACK PAIN WITH SCIATICA, SCIATICA LATERALITY UNSPECIFIED: ICD-10-CM

## 2017-11-08 DIAGNOSIS — M15.9 GENERALIZED OA: ICD-10-CM

## 2017-11-08 DIAGNOSIS — M54.40 CHRONIC BILATERAL LOW BACK PAIN WITH SCIATICA, SCIATICA LATERALITY UNSPECIFIED: ICD-10-CM

## 2017-11-08 RX ORDER — TRAMADOL HYDROCHLORIDE 50 MG/1
50-100 TABLET ORAL
Qty: 180 TAB | Refills: 1 | Status: SHIPPED | OUTPATIENT
Start: 2017-12-10 | End: 2018-02-05 | Stop reason: SDUPTHER

## 2017-11-08 RX ORDER — HYDROCODONE BITARTRATE AND ACETAMINOPHEN 10; 325 MG/1; MG/1
.5-1 TABLET ORAL
Qty: 30 TAB | Refills: 0 | Status: SHIPPED | OUTPATIENT
Start: 2017-12-27 | End: 2018-02-05 | Stop reason: SDUPTHER

## 2017-11-08 RX ORDER — HYDROCODONE BITARTRATE AND ACETAMINOPHEN 10; 325 MG/1; MG/1
.5-1 TABLET ORAL
Qty: 30 TAB | Refills: 0 | Status: SHIPPED | OUTPATIENT
Start: 2017-11-28 | End: 2018-02-05 | Stop reason: SDUPTHER

## 2017-11-08 RX ORDER — HYDROCODONE BITARTRATE AND ACETAMINOPHEN 10; 325 MG/1; MG/1
.5-1 TABLET ORAL
Qty: 30 TAB | Refills: 0 | Status: SHIPPED | OUTPATIENT
Start: 2018-01-26 | End: 2018-02-05 | Stop reason: SDUPTHER

## 2017-11-08 NOTE — MR AVS SNAPSHOT
Visit Information Date & Time Provider Department Dept. Phone Encounter #  
 11/8/2017  8:40 AM VENKATESH [facility] [id number] Follow-up Instructions Return in about 3 months (around 2/8/2018). Upcoming Health Maintenance Date Due Hepatitis C Screening 1950 DTaP/Tdap/Td series (1 - Tdap) 7/10/1970 FOBT Q 1 YEAR AGE 50-75 7/10/1999 ZOSTER VACCINE AGE 60> 5/10/2009 GLAUCOMA SCREENING Q2Y 7/10/2014 Pneumococcal 65+ Low/Medium Risk (1 of 2 - PCV13) 7/10/2014 MEDICARE YEARLY EXAM 7/10/2014 Influenza Age 5 to Adult 8/1/2017 Allergies as of 11/8/2017  Review Complete On: 11/8/2017 By: VENKATESH Scott No Known Allergies Current Immunizations  Never Reviewed No immunizations on file. Not reviewed this visit You Were Diagnosed With   
  
 Codes Comments Generalized OA     ICD-10-CM: M15.9 ICD-9-CM: 715.00 Polyarthralgia     ICD-10-CM: M25.50 ICD-9-CM: 719.49 Neuropathic pain     ICD-10-CM: M79.2 ICD-9-CM: 729.2 Chronic bilateral low back pain with sciatica, sciatica laterality unspecified     ICD-10-CM: M54.40, G89.29 ICD-9-CM: 724.2, 724.3, 338.29 Vitals BP Pulse Temp Resp Height(growth percentile) Weight(growth percentile) 133/83 (BP 1 Location: Left arm, BP Patient Position: Sitting) 68 97.2 °F (36.2 °C) (Oral) 18 6' 1\" (1.854 m) 286 lb (129.7 kg) BMI Smoking Status 37.73 kg/m2 Current Every Day Smoker Vitals History BMI and BSA Data Body Mass Index Body Surface Area  
 37.73 kg/m 2 2.58 m 2 Preferred Pharmacy Pharmacy Name Phone Neftali Knight [address], [address] [phone] Your Updated Medication List  
  
   
This list is accurate as of: 11/8/17  9:03 AM.  Always use your most recent med list.  
  
  
  
  
 amitriptyline 50 mg tablet Commonly known as:  ELAVIL Take  by mouth nightly. aspirin 81 mg chewable tablet Take 81 mg by mouth daily. cyanocobalamin 500 mcg tablet Commonly known as:  VITAMIN B12 Take 500 mcg by mouth daily. diclofenac 1 % Gel Commonly known as:  VOLTAREN Apply  to affected area four (4) times daily. gabapentin 100 mg capsule Commonly known as:  NEURONTIN Take  by mouth three (3) times daily. * HYDROcodone-acetaminophen  mg tablet Commonly known as:  Lynnetta Thomas Take 0.5-1 Tabs by mouth daily as needed for Pain for up to 30 days. Start taking on:  11/28/2017  
  
 * HYDROcodone-acetaminophen  mg tablet Commonly known as:  Lynnetta Thomas Take 0.5-1 Tabs by mouth daily as needed for Pain for up to 30 days. Start taking on:  12/27/2017  
  
 * HYDROcodone-acetaminophen  mg tablet Commonly known as:  Lynnetta Thomas Take 0.5-1 Tabs by mouth daily as needed for Pain for up to 30 days. Start taking on:  1/26/2018  
  
 ibuprofen 800 mg tablet Commonly known as:  MOTRIN Take  by mouth every eight (8) hours as needed for Pain. lisinopril 10 mg tablet Commonly known as:  Briana Pares Take  by mouth daily. omeprazole 20 mg capsule Commonly known as:  PRILOSEC Take 20 mg by mouth daily. * OTHER  
DDS Lumbar Traction Belt * OTHER Safe Step walk-in tub TENS Units Susanne Dy Use as directed  
  
 tiZANidine 4 mg capsule Commonly known as:  Choctaw Sleek Take 4 mg by mouth two (2) times a day. * traMADol 50 mg tablet Commonly known as:  ULTRAM  
Take 50 mg by mouth two (2) times a day. * traMADol 50 mg tablet Commonly known as:  ULTRAM  
Take 1-2 Tabs by mouth three (3) times daily as needed for Pain for up to 30 days. Max Daily Amount: 300 mg. Indications: NEUROPATHIC PAIN, Pain Start taking on:  12/10/2017 * Notice:   This list has 7 medication(s) that are the same as other medications prescribed for you. Read the directions carefully, and ask your doctor or other care provider to review them with you. Prescriptions Printed Refills  
 traMADol (ULTRAM) 50 mg tablet 1 Starting on: 12/10/2017 Sig: Take 1-2 Tabs by mouth three (3) times daily as needed for Pain for up to 30 days. Max Daily Amount: 300 mg. Indications: NEUROPATHIC PAIN, Pain Class: Print Route: Oral  
 HYDROcodone-acetaminophen (NORCO)  mg tablet 0 Starting on: 11/28/2017 Sig: Take 0.5-1 Tabs by mouth daily as needed for Pain for up to 30 days. Class: Print Route: Oral  
 HYDROcodone-acetaminophen (NORCO)  mg tablet 0 Starting on: 12/27/2017 Sig: Take 0.5-1 Tabs by mouth daily as needed for Pain for up to 30 days. Class: Print Route: Oral  
 HYDROcodone-acetaminophen (NORCO)  mg tablet 0 Starting on: 1/26/2018 Sig: Take 0.5-1 Tabs by mouth daily as needed for Pain for up to 30 days. Class: Print Route: Oral  
  
Follow-up Instructions Return in about 3 months (around 2/8/2018). Patient Instructions 1. Continue current plan with no evidence of addiction or diversion. Stable on current medication without adverse events. 2. Refill hydrocodone 10/325 mg. Take 1/2 to 1 tablet once daily as needed for pain. Currently has unfilled prescription dated to fill 4/8.   
3. Refill tramadol 50 mg  12 tablets up to 3 times daily as needed for pain. 3 refills 4. Discussed risks of addiction, dependency, and opioid induced hyperalgesia. 5. Return to clinic in 3 months Introducing [address]! [employer] introduces [employer] patient portal. Now you can access parts of your medical record, email your doctor's office, and request medication refills online. 1. In your internet browser, go to [email or web address] 2. Click on the First Time User? Click Here link in the Sign In box.  You will see the New Member Sign Up page. 3. Enter your [employer] Access Code exactly as it appears below. You will not need to use this code after youve completed the sign-up process. If you do not sign up before the expiration date, you must request a new code. · [employer] Access Code: [email or web address] Expires: 11/12/2017  8:06 AM 
 
4. Enter the last four digits of your Social Security Number (xxxx) and Date of Birth (mm/dd/yyyy) as indicated and click Submit. You will be taken to the next sign-up page. 5. Create a [employer] ID. This will be your [employer] login ID and cannot be changed, so think of one that is secure and easy to remember. 6. Create a [employer] password. You can change your password at any time. 7. Enter your Password Reset Question and Answer. This can be used at a later time if you forget your password. 8. Enter your e-mail address. You will receive e-mail notification when new information is available in [address]. 9. Click Sign Up. You can now view and download portions of your medical record. 10. Click the Download Summary menu link to download a portable copy of your medical information. If you have questions, please visit the Frequently Asked Questions section of the [employer] website. Remember, [employer] is NOT to be used for urgent needs. For medical emergencies, dial 911. Now available from your iPhone and Android! Please provide this summary of care documentation to your next provider. If you have any questions after today's visit, please call [phone].
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
## 2017-11-08 NOTE — ACP (ADVANCE CARE PLANNING)
Patient has an acp and do not have any questions at this time. Patient verbalized understanding and will bring into next visit.

## 2017-11-08 NOTE — PROGRESS NOTES
Nursing Notes    Patient presents to the office today in follow-up. Patient rates his pain at 4/10 on the numerical pain scale. Reviewed medications with counts as follows:    Rx Date filled Qty Dispensed Pill Count Last Dose Short   norco  10/29/17 30 25 yesterday no   Tramadol 50 mg 10/11/17 180 33 This a.m no                                POC UDS was not performed in office today    Any new labs or imaging since last appointment? NO    Have you been to an emergency room (ER) or urgent care clinic since your last visit? NO            Have you been hospitalized since your last visit? NO     If yes, where, when, and reason for visit? Have you seen or consulted any other health care providers outside of the [address] [place] [address]  since your last visit? NO     If yes, where, when, and reason for visit? HM deferred to pcp.

## 2018-02-05 ENCOUNTER — OFFICE VISIT (OUTPATIENT)
Dept: PAIN MANAGEMENT | Age: 68
End: 2018-02-05

## 2018-02-05 VITALS
HEART RATE: 65 BPM | SYSTOLIC BLOOD PRESSURE: 148 MMHG | TEMPERATURE: 97.8 F | RESPIRATION RATE: 16 BRPM | BODY MASS INDEX: 39.58 KG/M2 | DIASTOLIC BLOOD PRESSURE: 78 MMHG | WEIGHT: 300 LBS

## 2018-02-05 DIAGNOSIS — M79.2 NEUROPATHIC PAIN: ICD-10-CM

## 2018-02-05 DIAGNOSIS — M25.50 POLYARTHRALGIA: ICD-10-CM

## 2018-02-05 DIAGNOSIS — G89.29 CHRONIC BILATERAL LOW BACK PAIN WITH SCIATICA, SCIATICA LATERALITY UNSPECIFIED: ICD-10-CM

## 2018-02-05 DIAGNOSIS — Z79.899 ENCOUNTER FOR LONG-TERM (CURRENT) USE OF HIGH-RISK MEDICATION: Primary | ICD-10-CM

## 2018-02-05 DIAGNOSIS — M15.9 GENERALIZED OA: ICD-10-CM

## 2018-02-05 DIAGNOSIS — M54.40 CHRONIC BILATERAL LOW BACK PAIN WITH SCIATICA, SCIATICA LATERALITY UNSPECIFIED: ICD-10-CM

## 2018-02-05 LAB
ALCOHOL UR POC: NORMAL
AMPHETAMINES UR POC: NEGATIVE
BARBITURATES UR POC: NEGATIVE
BENZODIAZEPINES UR POC: NEGATIVE
BUPRENORPHINE UR POC: NEGATIVE
CANNABINOIDS UR POC: NEGATIVE
CARISOPRODOL UR POC: NORMAL
COCAINE UR POC: NEGATIVE
FENTANYL UR POC: NORMAL
MDMA/ECSTASY UR POC: NORMAL
METHADONE UR POC: NEGATIVE
METHAMPHETAMINE UR POC: NORMAL
METHYLPHENIDATE UR POC: NORMAL
OPIATES UR POC: NEGATIVE
OXYCODONE UR POC: NORMAL
PHENCYCLIDINE UR POC: NORMAL
PROPOXYPHENE UR POC: NORMAL
TRAMADOL UR POC: NORMAL
TRICYCLICS UR POC: NORMAL

## 2018-02-05 RX ORDER — TRAMADOL HYDROCHLORIDE 50 MG/1
50-100 TABLET ORAL
Qty: 180 TAB | Refills: 2 | Status: SHIPPED | OUTPATIENT
Start: 2018-02-08 | End: 2018-05-03 | Stop reason: SDUPTHER

## 2018-02-05 RX ORDER — HYDROCODONE BITARTRATE AND ACETAMINOPHEN 10; 325 MG/1; MG/1
.5-1 TABLET ORAL
Qty: 60 TAB | Refills: 0 | Status: SHIPPED | OUTPATIENT
Start: 2018-02-06 | End: 2018-03-08

## 2018-02-05 RX ORDER — HYDROCODONE BITARTRATE AND ACETAMINOPHEN 10; 325 MG/1; MG/1
.5-1 TABLET ORAL
Qty: 60 TAB | Refills: 0 | Status: SHIPPED | OUTPATIENT
Start: 2018-04-06 | End: 2018-05-03 | Stop reason: SDUPTHER

## 2018-02-05 RX ORDER — HYDROCODONE BITARTRATE AND ACETAMINOPHEN 10; 325 MG/1; MG/1
.5-1 TABLET ORAL
Qty: 60 TAB | Refills: 0 | Status: SHIPPED | OUTPATIENT
Start: 2018-03-07 | End: 2018-04-06

## 2018-02-05 NOTE — PATIENT INSTRUCTIONS
1. Continue current plan with no evidence of addiction or diversion. Stable on current medication without adverse events. 2. Refill and adjust hydrocodone 10/325 mg. Please take half to 1 tablet up to 2 times daily as needed. 3. Refill tramadol 50 mg  1-2 tablets up to 3 times daily as needed for pain. 3 refills  4. Discussed risks of addiction, dependency, and opioid induced hyperalgesia.    5. Return to clinic in 3 months

## 2018-02-05 NOTE — PROGRESS NOTES
Nursing Notes    Patient presents to the office today in follow-up. Patient rates his pain at 4/10 on the numerical pain scale. Reviewed medications with counts as follows:    Rx Date filled Qty Dispensed Pill Count Last Dose Short   Tramadol 50 mg 01/09/18 180 42 This am  no   Norco 10 m g 01/03/18 30 7 2 days ago no         Comments: Patient is here today for a follow up appt today he states his pain level today is a 4    He states he has seen his Ortho MD and an xray was done on his ankle     POC UDS was performed in office today per verbal order per [facility]     Any new labs or imaging since last appointment? YES Xray done on ankle at Ortho    Have you been to an emergency room (ER) or urgent care clinic since your last visit? NO            Have you been hospitalized since your last visit? NO     If yes, where, when, and reason for visit? Have you seen or consulted any other health care providers outside of the [address] [place] [address]  since your last visit? YES Ortho      If yes, where, when, and reason for visit? HM deferred to pcp.
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
## 2018-02-05 NOTE — MR AVS SNAPSHOT
[address] [address] 
[phone] Patient: Beckie Mayo 
MRN: [id number] Visit Information Date & Time Provider Department Dept. Phone Encounter #  
 2/5/2018  9:00 AM [facility] [id number] Follow-up Instructions Return in about 3 months (around 5/5/2018). Upcoming Health Maintenance Date Due Hepatitis C Screening 1950 DTaP/Tdap/Td series (1 - Tdap) 7/10/1970 FOBT Q 1 YEAR AGE 50-75 7/10/1999 ZOSTER VACCINE AGE 60> 5/10/2009 GLAUCOMA SCREENING Q2Y 7/10/2014 Pneumococcal 65+ Low/Medium Risk (1 of 2 - PCV13) 7/10/2014 MEDICARE YEARLY EXAM 7/10/2014 Influenza Age 5 to Adult 8/1/2017 Allergies as of 2/5/2018  Review Complete On: 2/5/2018 By: VENKATESH Seo No Known Allergies Current Immunizations  Never Reviewed No immunizations on file. Not reviewed this visit You Were Diagnosed With   
  
 Codes Comments Encounter for long-term (current) use of high-risk medication    -  Primary ICD-10-CM: D38.952 ICD-9-CM: V58.69 Generalized OA     ICD-10-CM: M15.9 ICD-9-CM: 715.00 Polyarthralgia     ICD-10-CM: M25.50 ICD-9-CM: 719.49 Neuropathic pain     ICD-10-CM: M79.2 ICD-9-CM: 729.2 Chronic bilateral low back pain with sciatica, sciatica laterality unspecified     ICD-10-CM: M54.40, G89.29 ICD-9-CM: 724.2, 724.3, 338.29 Vitals BP Pulse Temp Resp Weight(growth percentile) BMI  
 148/78 (BP 1 Location: Left arm, BP Patient Position: Sitting) 65 97.8 °F (36.6 °C) 16 300 lb (136.1 kg) 39.58 kg/m2 Smoking Status Current Every Day Smoker BMI and BSA Data Body Mass Index Body Surface Area  
 39.58 kg/m 2 2.65 m 2 Preferred Pharmacy Pharmacy Name Phone ALIC[facility] AGOSTO [facility] [address], [address] [phone] Your Updated Medication List  
  
   
This list is accurate as of: 2/5/18  9:38 AM.  Always use your most recent med list.  
  
  
  
  
 amitriptyline 50 mg tablet Commonly known as:  ELAVIL Take  by mouth nightly. aspirin 81 mg chewable tablet Take 81 mg by mouth daily. cyanocobalamin 500 mcg tablet Commonly known as:  VITAMIN B12 Take 500 mcg by mouth daily. diclofenac 1 % Gel Commonly known as:  VOLTAREN Apply  to affected area four (4) times daily. gabapentin 100 mg capsule Commonly known as:  NEURONTIN Take  by mouth three (3) times daily. * HYDROcodone-acetaminophen  mg tablet Commonly known as:  Barbie Alvarado Take 0.5-1 Tabs by mouth two (2) times daily as needed for Pain for up to 30 days. Max Daily Amount: 2 Tabs. Start taking on:  2/6/2018  
  
 * HYDROcodone-acetaminophen  mg tablet Commonly known as:  Barbie Alvarado Take 0.5-1 Tabs by mouth two (2) times daily as needed for Pain for up to 30 days. Max Daily Amount: 2 Tabs. Start taking on:  3/7/2018  
  
 * HYDROcodone-acetaminophen  mg tablet Commonly known as:  Barbie Alvarado Take 0.5-1 Tabs by mouth two (2) times daily as needed for Pain for up to 30 days. Max Daily Amount: 2 Tabs. Start taking on:  4/6/2018  
  
 ibuprofen 800 mg tablet Commonly known as:  MOTRIN Take  by mouth every eight (8) hours as needed for Pain. lisinopril 10 mg tablet Commonly known as:  Rheta Shimon Take  by mouth daily. omeprazole 20 mg capsule Commonly known as:  PRILOSEC Take 20 mg by mouth daily. * OTHER  
DDS Lumbar Traction Belt * OTHER Safe Step walk-in tub TENS Units Chinedu Use as directed  
  
 tiZANidine 4 mg capsule Commonly known as:  Kellen Ramirez Take 4 mg by mouth two (2) times a day. * traMADol 50 mg tablet Commonly known as:  Angela Allan  
 Take 50 mg by mouth two (2) times a day. * traMADol 50 mg tablet Commonly known as:  ULTRAM  
Take 1-2 Tabs by mouth three (3) times daily as needed for Pain for up to 30 days. Max Daily Amount: 300 mg. Indications: NEUROPATHIC PAIN, Pain Start taking on:  2/8/2018 * Notice: This list has 7 medication(s) that are the same as other medications prescribed for you. Read the directions carefully, and ask your doctor or other care provider to review them with you. Prescriptions Printed Refills HYDROcodone-acetaminophen (NORCO)  mg tablet 0 Starting on: 2/6/2018 Sig: Take 0.5-1 Tabs by mouth two (2) times daily as needed for Pain for up to 30 days. Max Daily Amount: 2 Tabs. Class: Print Route: Oral  
 HYDROcodone-acetaminophen (NORCO)  mg tablet 0 Starting on: 3/7/2018 Sig: Take 0.5-1 Tabs by mouth two (2) times daily as needed for Pain for up to 30 days. Max Daily Amount: 2 Tabs. Class: Print Route: Oral  
 HYDROcodone-acetaminophen (NORCO)  mg tablet 0 Starting on: 4/6/2018 Sig: Take 0.5-1 Tabs by mouth two (2) times daily as needed for Pain for up to 30 days. Max Daily Amount: 2 Tabs. Class: Print Route: Oral  
 traMADol (ULTRAM) 50 mg tablet 2 Starting on: 2/8/2018 Sig: Take 1-2 Tabs by mouth three (3) times daily as needed for Pain for up to 30 days. Max Daily Amount: 300 mg. Indications: NEUROPATHIC PAIN, Pain Class: Print Route: Oral  
  
We Performed the Following AMB POC DRUG SCREEN () [ \[facility]\""] DRUG SCREEN [[id number] Custom] Follow-up Instructions Return in about 3 months (around 5/5/2018). Patient Instructions 1. Continue current plan with no evidence of addiction or diversion. Stable on current medication without adverse events. 2. Refill and adjust hydrocodone 10/325 mg. Please take half to 1 tablet up to 2 times daily as needed. 3. Refill tramadol 50 mg  12 tablets up to 3 times daily as needed for pain. 3 refills 4. Discussed risks of addiction, dependency, and opioid induced hyperalgesia. 5. Return to clinic in 3 months Introducing [facility]! Chente Dillon introduces [email or web address] patient portal. Now you can access parts of your medical record, email your doctor's office, and request medication refills online. 1. In your internet browser, go to [email or web address] 2. Click on the First Time User? Click Here link in the Sign In box. You will see the New Member Sign Up page. 3. Enter your [email or web address] Access Code exactly as it appears below. You will not need to use this code after youve completed the sign-up process. If you do not sign up before the expiration date, you must request a new code. · [email or web address] Access Code: [email or web address] Expires: 5/6/2018  9:38 AM 
 
4. Enter the last four digits of your Social Security Number (xxxx) and Date of Birth (mm/dd/yyyy) as indicated and click Submit. You will be taken to the next sign-up page. 5. Create a [email or web address] ID. This will be your [email or web address] login ID and cannot be changed, so think of one that is secure and easy to remember. 6. Create a [email or web address] password. You can change your password at any time. 7. Enter your Password Reset Question and Answer. This can be used at a later time if you forget your password. 8. Enter your e-mail address. You will receive e-mail notification when new information is available in [address] 9. Click Sign Up. You can now view and download portions of your medical record. 10. Click the Download Summary menu link to download a portable copy of your medical information. If you have questions, please visit the Frequently Asked Questions section of the [email or web address] website. Remember, [email or web address] is NOT to be used for urgent needs. For medical emergencies, dial 911. Now available from your iPhone and Android! Please provide this summary of care documentation to your next provider. If you have any questions after today's visit, please call [phone].

## 2018-05-03 ENCOUNTER — OFFICE VISIT (OUTPATIENT)
Dept: PAIN MANAGEMENT | Age: 68
End: 2018-05-03

## 2018-05-03 VITALS
BODY MASS INDEX: 39.76 KG/M2 | SYSTOLIC BLOOD PRESSURE: 83 MMHG | WEIGHT: 300 LBS | DIASTOLIC BLOOD PRESSURE: 60 MMHG | TEMPERATURE: 98.7 F | HEART RATE: 78 BPM | HEIGHT: 73 IN | RESPIRATION RATE: 16 BRPM

## 2018-05-03 DIAGNOSIS — G89.29 CHRONIC BILATERAL LOW BACK PAIN WITH SCIATICA, SCIATICA LATERALITY UNSPECIFIED: ICD-10-CM

## 2018-05-03 DIAGNOSIS — M54.40 CHRONIC BILATERAL LOW BACK PAIN WITH SCIATICA, SCIATICA LATERALITY UNSPECIFIED: ICD-10-CM

## 2018-05-03 DIAGNOSIS — M25.50 POLYARTHRALGIA: ICD-10-CM

## 2018-05-03 DIAGNOSIS — M15.9 GENERALIZED OA: ICD-10-CM

## 2018-05-03 DIAGNOSIS — M79.2 NEUROPATHIC PAIN: ICD-10-CM

## 2018-05-03 RX ORDER — TRAMADOL HYDROCHLORIDE 50 MG/1
50-100 TABLET ORAL
Qty: 180 TAB | Refills: 2 | Status: SHIPPED | OUTPATIENT
Start: 2018-05-07 | End: 2018-06-04 | Stop reason: SDUPTHER

## 2018-05-03 RX ORDER — HYDROCODONE BITARTRATE AND ACETAMINOPHEN 10; 325 MG/1; MG/1
.5-1 TABLET ORAL
Qty: 60 TAB | Refills: 0 | Status: SHIPPED | OUTPATIENT
Start: 2018-05-08 | End: 2018-06-04 | Stop reason: SDUPTHER

## 2018-05-03 NOTE — PROGRESS NOTES
Nursing Notes    Patient presents to the office today in follow-up. Patient rates his pain at 7/10 on the numerical pain scale. Reviewed medications with counts as follows:    Rx Date filled Qty Dispensed Pill Count Last Dose Short   Tramadol 50 mg tab 4/8/18 180 60 today no   Norco  mg tab 4/9/18 60 61.5 today no                                  Comments: The pt's COMM was completed and given to the provider for review. The pt scored an      POC UDS was not performed in office today    Any new labs or imaging since last appointment? YES, lab work and Xrays, and CT scan. Have you been to an emergency room (ER) or urgent care clinic since your last visit? YES, ER            Have you been hospitalized since your last visit? NO     If yes, where, when, and reason for visit? Have you seen or consulted any other health care providers outside of the [address] [place] [address]  since your last visit? Mahendra [address] Dr, MARILYN Brennan and PCP. If yes, where, when, and reason for visit? Mr. Glory Cervantes has a reminder for a \"due or due soon\" health maintenance. I have asked that he contact his primary care provider for follow-up on this health maintenance.

## 2018-05-03 NOTE — MR AVS SNAPSHOT
[address] [address] 
[phone] Patient: Arabella Jain 
MRN: [id number] Visit Information Date & Time Provider Department Dept. Phone Encounter #  
 5/3/2018  9:20 AM Mar Schirmer, [address] [facility] [phone] [id number] Follow-up Instructions Return in about 3 months (around 8/3/2018). Upcoming Health Maintenance Date Due Hepatitis C Screening 1950 DTaP/Tdap/Td series (1 - Tdap) 7/10/1970 FOBT Q 1 YEAR AGE 50-75 7/10/1999 ZOSTER VACCINE AGE 60> 5/10/2009 GLAUCOMA SCREENING Q2Y 7/10/2014 Pneumococcal 65+ Low/Medium Risk (1 of 2 - PCV13) 7/10/2014 MEDICARE YEARLY EXAM 4/12/2018 Influenza Age 5 to Adult 8/1/2018 Allergies as of 5/3/2018  Review Complete On: 5/3/2018 By: Mar Schirmer, PA No Known Allergies Current Immunizations  Never Reviewed No immunizations on file. Not reviewed this visit You Were Diagnosed With   
  
 Codes Comments Chronic bilateral low back pain with sciatica, sciatica laterality unspecified     ICD-10-CM: M54.40, G89.29 ICD-9-CM: 724.2, 724.3, 338.29 Generalized OA     ICD-10-CM: M15.9 ICD-9-CM: 715.00 Polyarthralgia     ICD-10-CM: M25.50 ICD-9-CM: 719.49 Neuropathic pain     ICD-10-CM: M79.2 ICD-9-CM: 729.2 Vitals BP Pulse Temp Resp Height(growth percentile) Weight(growth percentile) (!) 83/60 (BP 1 Location: Left arm, BP Patient Position: Sitting) 78 98.7 °F (37.1 °C) (Oral) 16 6' 1\" (1.854 m) 300 lb (136.1 kg) BMI Smoking Status 39.58 kg/m2 Current Every Day Smoker Vitals History BMI and BSA Data Body Mass Index Body Surface Area  
 39.58 kg/m 2 2.65 m 2 Preferred Pharmacy Pharmacy Name Phone [facility] [place] [address], [address] [facility] [phone] Your Updated Medication List  
  
   
This list is accurate as of 5/3/18 10:12 AM.  Always use your most recent med list.  
  
  
  
  
 amitriptyline 50 mg tablet Commonly known as:  ELAVIL Take  by mouth nightly. aspirin 81 mg chewable tablet Take 81 mg by mouth daily. CARAFATE 1 gram tablet Generic drug:  sucralfate Take 1 g by mouth four (4) times daily. cyanocobalamin 500 mcg tablet Commonly known as:  VITAMIN B12 Take 500 mcg by mouth daily. diclofenac 1 % Gel Commonly known as:  VOLTAREN Apply  to affected area four (4) times daily. gabapentin 100 mg capsule Commonly known as:  NEURONTIN Take  by mouth three (3) times daily. HYDROcodone-acetaminophen  mg tablet Commonly known as:  Alek Quirk Take 0.5-1 Tabs by mouth two (2) times daily as needed for Pain for up to 30 days. Max Daily Amount: 2 Tabs. Start taking on:  5/8/2018  
  
 ibuprofen 800 mg tablet Commonly known as:  MOTRIN Take  by mouth every eight (8) hours as needed for Pain. lisinopril-hydroCHLOROthiazide 10-12.5 mg per tablet Commonly known as:  Kj Pelt Take  by mouth daily. omeprazole 20 mg capsule Commonly known as:  PRILOSEC Take 20 mg by mouth two (2) times a day. OTHER  
DDS Lumbar Traction Belt OTHER Safe Step walk-in tub TENS Units Abdirahman Sharp Use as directed  
  
 tiZANidine 4 mg capsule Commonly known as:  Leeann Gastelum Take 4 mg by mouth two (2) times a day. * traMADol 50 mg tablet Commonly known as:  ULTRAM  
Take 50 mg by mouth two (2) times a day. * traMADol 50 mg tablet Commonly known as:  ULTRAM  
Take 1-2 Tabs by mouth three (3) times daily as needed for Pain for up to 30 days. Max Daily Amount: 300 mg. Indications: NEUROPATHIC PAIN, Pain Start taking on:  5/7/2018 * Notice:   This list has 2 medication(s) that are the same as other medications prescribed for you. Read the directions carefully, and ask your doctor or other care provider to review them with you. Prescriptions Printed Refills HYDROcodone-acetaminophen (NORCO)  mg tablet 0 Starting on: 5/8/2018 Sig: Take 0.5-1 Tabs by mouth two (2) times daily as needed for Pain for up to 30 days. Max Daily Amount: 2 Tabs. Class: Print Route: Oral  
 traMADol (ULTRAM) 50 mg tablet 2 Starting on: 5/7/2018 Sig: Take 1-2 Tabs by mouth three (3) times daily as needed for Pain for up to 30 days. Max Daily Amount: 300 mg. Indications: NEUROPATHIC PAIN, Pain Class: Print Route: Oral  
  
Follow-up Instructions Return in about 3 months (around 8/3/2018). Patient Instructions 1. Continue current plan with no evidence of addiction or diversion. Stable on current medication without adverse events. 2. Refill and adjust hydrocodone 10/325 mg. Please take half to 1 tablet up to 2 times daily as needed. 3. Refill tramadol 50 mg  12 tablets up to 3 times daily as needed for pain. 3 refills 4. Please contact your PCP as soon as possible to get your blood pressure under better control. 5. Discussed risks of addiction, dependency, and opioid induced hyperalgesia. 6. Please remember to call at least 3-4 business days prior to your medication refill. 7. Return to clinic in 3 months. Please remember to call and cancel your appointment and pain management agreement if you do decide to transition her care. Introducing [facility]! [employer] introduces Real Time Content patient portal. Now you can access parts of your medical record, email your doctor's office, and request medication refills online. 1. In your internet browser, go to [email or web address] 2. Click on the First Time User? Click Here link in the Sign In box. You will see the New Member Sign Up page. 3. Enter your [employer] Access Code exactly as it appears below. You will not need to use this code after youve completed the sign-up process. If you do not sign up before the expiration date, you must request a new code. · [employer] Access Code: [email or web address] Expires: 5/6/2018 10:38 AM 
 
4. Enter the last four digits of your Social Security Number (xxxx) and Date of Birth (mm/dd/yyyy) as indicated and click Submit. You will be taken to the next sign-up page. 5. Create a [employer] ID. This will be your [employer] login ID and cannot be changed, so think of one that is secure and easy to remember. 6. Create a [employer] password. You can change your password at any time. 7. Enter your Password Reset Question and Answer. This can be used at a later time if you forget your password. 8. Enter your e-mail address. You will receive e-mail notification when new information is available in [address]. 9. Click Sign Up. You can now view and download portions of your medical record. 10. Click the Download Summary menu link to download a portable copy of your medical information. If you have questions, please visit the Frequently Asked Questions section of the [employer] website. Remember, [employer] is NOT to be used for urgent needs. For medical emergencies, dial 911. Now available from your iPhone and Android! Please provide this summary of care documentation to your next provider. Your primary care clinician is listed as Reta Bishop. If you have any questions after today's visit, please call [phone].
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
## 2018-06-04 DIAGNOSIS — M54.40 CHRONIC BILATERAL LOW BACK PAIN WITH SCIATICA, SCIATICA LATERALITY UNSPECIFIED: ICD-10-CM

## 2018-06-04 DIAGNOSIS — G89.29 CHRONIC BILATERAL LOW BACK PAIN WITH SCIATICA, SCIATICA LATERALITY UNSPECIFIED: ICD-10-CM

## 2018-06-04 NOTE — TELEPHONE ENCOUNTER
[facility] has called requesting a refill of their controlled medication, norco 10/325mg and tramadol 50mg, for the management of chronic generalized pain. Last office visit date: 05/03/18    Date last  was pulled and reviewed : 06/04/18    Was the patient compliant when the above report was pulled? yes    Analgesia: 70% pain relief noted     Aberrancies: none noted    ADL's: patient expresses ability to execute adl care    Adverse Reaction: none noted     Provider's last note and plan of care reviewed? yes  Request forwarded to provider for review.

## 2018-06-05 RX ORDER — TRAMADOL HYDROCHLORIDE 50 MG/1
50-100 TABLET ORAL
Qty: 180 TAB | Refills: 2 | Status: SHIPPED | OUTPATIENT
Start: 2018-06-09 | End: 2018-07-09

## 2018-06-05 RX ORDER — HYDROCODONE BITARTRATE AND ACETAMINOPHEN 10; 325 MG/1; MG/1
.5-1 TABLET ORAL
Qty: 60 TAB | Refills: 0 | Status: SHIPPED | OUTPATIENT
Start: 2018-06-09 | End: 2018-07-30 | Stop reason: SDUPTHER

## 2018-07-30 ENCOUNTER — OFFICE VISIT (OUTPATIENT)
Dept: PAIN MANAGEMENT | Age: 68
End: 2018-07-30

## 2018-07-30 VITALS
HEIGHT: 73 IN | TEMPERATURE: 97 F | SYSTOLIC BLOOD PRESSURE: 132 MMHG | HEART RATE: 75 BPM | BODY MASS INDEX: 39.76 KG/M2 | RESPIRATION RATE: 14 BRPM | DIASTOLIC BLOOD PRESSURE: 81 MMHG | WEIGHT: 300 LBS

## 2018-07-30 DIAGNOSIS — Z79.899 ENCOUNTER FOR LONG-TERM (CURRENT) USE OF HIGH-RISK MEDICATION: ICD-10-CM

## 2018-07-30 DIAGNOSIS — M25.50 POLYARTHRALGIA: ICD-10-CM

## 2018-07-30 DIAGNOSIS — M54.40 CHRONIC BILATERAL LOW BACK PAIN WITH SCIATICA, SCIATICA LATERALITY UNSPECIFIED: Primary | ICD-10-CM

## 2018-07-30 DIAGNOSIS — G89.29 CHRONIC BILATERAL LOW BACK PAIN WITH SCIATICA, SCIATICA LATERALITY UNSPECIFIED: Primary | ICD-10-CM

## 2018-07-30 DIAGNOSIS — M15.9 GENERALIZED OA: ICD-10-CM

## 2018-07-30 DIAGNOSIS — M79.2 NEUROPATHIC PAIN: ICD-10-CM

## 2018-07-30 LAB
ALCOHOL UR POC: NORMAL
AMPHETAMINES UR POC: NEGATIVE
BARBITURATES UR POC: NORMAL
BENZODIAZEPINES UR POC: NEGATIVE
BUPRENORPHINE UR POC: NEGATIVE
CANNABINOIDS UR POC: NEGATIVE
CARISOPRODOL UR POC: NORMAL
COCAINE UR POC: NEGATIVE
FENTANYL UR POC: NORMAL
MDMA/ECSTASY UR POC: NORMAL
METHADONE UR POC: NEGATIVE
METHAMPHETAMINE UR POC: NORMAL
METHYLPHENIDATE UR POC: NORMAL
OPIATES UR POC: NORMAL
OXYCODONE UR POC: NORMAL
PHENCYCLIDINE UR POC: NORMAL
PROPOXYPHENE UR POC: NORMAL
TRAMADOL UR POC: NORMAL
TRICYCLICS UR POC: NORMAL

## 2018-07-30 RX ORDER — NALOXONE HYDROCHLORIDE 2 MG/.4ML
2 INJECTION, SOLUTION INTRAMUSCULAR; SUBCUTANEOUS
Qty: 1 DEVICE | Refills: 1 | Status: SHIPPED | OUTPATIENT
Start: 2018-07-30 | End: 2018-07-30

## 2018-07-30 RX ORDER — HYDROCODONE BITARTRATE AND ACETAMINOPHEN 10; 325 MG/1; MG/1
.5-1 TABLET ORAL
Qty: 60 TAB | Refills: 0 | Status: SHIPPED | OUTPATIENT
Start: 2018-08-09 | End: 2018-09-08

## 2018-07-30 RX ORDER — HYDROCODONE BITARTRATE AND ACETAMINOPHEN 10; 325 MG/1; MG/1
.5-1 TABLET ORAL
Qty: 60 TAB | Refills: 0 | Status: SHIPPED | OUTPATIENT
Start: 2018-10-07 | End: 2018-10-25 | Stop reason: DRUGHIGH

## 2018-07-30 RX ORDER — HYDROCODONE BITARTRATE AND ACETAMINOPHEN 10; 325 MG/1; MG/1
.5-1 TABLET ORAL
Qty: 60 TAB | Refills: 0 | Status: SHIPPED | OUTPATIENT
Start: 2018-09-08 | End: 2018-08-31 | Stop reason: SDUPTHER

## 2018-07-30 NOTE — PATIENT INSTRUCTIONS
1. Continue current plan with no evidence of addiction or diversion. Stable on current medication without adverse events. 2. Refill  hydrocodone 10/325 mg. Please take half to 1 tablet up to 2 times daily as needed. 3. Continue tramadol 50 mg  1-2 tablets up to 3 times daily as needed for pain. 2 refills remaining. Plan to taper next visit. 4. Add naloxone nasal spray for opioid induced respiratory depression emergency only. Extensive counseling was provided regarding this medication. 5. Discussed risks of addiction, dependency, and opioid induced hyperalgesia. Please remember to call 7 days prior to your medication refill. Return to clinic in 3 months. Please call and cancel your appointment and pain management agreement if you do decide to transfer your care.

## 2018-07-30 NOTE — PROGRESS NOTES
Nursing Notes    Patient presents to the office today in follow-up. Patient rates his pain at 3/10 on the numerical pain scale. Reviewed medications with counts as follows:    Rx Date filled Qty Dispensed Pill Count Last Dose Short   Tramadol 50 mg 07/07/18 180 74 This am  no   Norco 10 mg 06/22/18 60 22.5 yesterday no         Comments: Patient is here today for a follow up appt today he states his pain level today is a 3  PHQ 9 was done patient denies any depression at this time. He states that he has Barium swallow test done and imaging of his stomach     POC UDS was performed in office today per verbal order per [facility]    Any new labs or imaging since last appointment? YES imaging was done at [facility] [place]    Have you been to an emergency room (ER) or urgent care clinic since your last visit? NO            Have you been hospitalized since your last visit? NO     If yes, where, when, and reason for visit? Have you seen or consulted any other health care providers outside of the [facility]  since your last visit? NO     If yes, where, when, and reason for visit? Mr. Becky Chávez has a reminder for a \"due or due soon\" health maintenance. I have asked that he contact his primary care provider for follow-up on this health maintenance.

## 2018-07-30 NOTE — MR AVS SNAPSHOT
[address] [facility] [address] [phone] Patient: Angel Stovall 
MRN: [id number] SGL:[id number] Visit Information Date & Time Provider Department Dept. Phone Encounter #  
 7/30/2018  8:00 AM Jorja Crigler, [address] [place] [address] for Pain Management [id number] [phone] Follow-up Instructions Return in about 3 months (around 10/30/2018). Upcoming Health Maintenance Date Due Hepatitis C Screening 1950 DTaP/Tdap/Td series (1 - Tdap) 7/10/1970 FOBT Q 1 YEAR AGE 50-75 7/10/1999 ZOSTER VACCINE AGE 60> 5/10/2009 GLAUCOMA SCREENING Q2Y 7/10/2014 Pneumococcal 65+ Low/Medium Risk (1 of 2 - PCV13) 7/10/2014 Influenza Age 5 to Adult 8/1/2018 Allergies as of 7/30/2018  Review Complete On: 7/30/2018 By: Jorja Crigler, PA No Known Allergies Current Immunizations  Never Reviewed No immunizations on file. Not reviewed this visit You Were Diagnosed With   
  
 Codes Comments Chronic bilateral low back pain with sciatica, sciatica laterality unspecified    -  Primary ICD-10-CM: M54.40, G89.29 ICD-9-CM: 724.2, 724.3, 338.29 Generalized OA     ICD-10-CM: M15.9 ICD-9-CM: 715.00 Polyarthralgia     ICD-10-CM: M25.50 ICD-9-CM: 719.49 Neuropathic pain     ICD-10-CM: M79.2 ICD-9-CM: 729.2 Encounter for long-term (current) use of high-risk medication     ICD-10-CM: Z79.899 ICD-9-CM: V58.69 Vitals BP Pulse Temp Resp Height(growth percentile) Weight(growth percentile) 132/81 (BP 1 Location: Left arm, BP Patient Position: Sitting) 75 97 °F (36.1 °C) 14 6' 1\" (1.854 m) 300 lb (136.1 kg) BMI Smoking Status 39.58 kg/m2 Current Every Day Smoker BMI and BSA Data Body Mass Index Body Surface Area  
 39.58 kg/m 2 2.65 m 2 Preferred Pharmacy Pharmacy Name Phone [id number] - [address], [address] [place] [address] Road [phone] Your Updated Medication List  
  
   
This list is accurate as of 7/30/18  8:52 AM.  Always use your most recent med list.  
  
  
  
  
 amitriptyline 50 mg tablet Commonly known as:  ELAVIL Take  by mouth nightly. aspirin 81 mg chewable tablet Take 81 mg by mouth daily. CARAFATE 1 gram tablet Generic drug:  sucralfate Take 1 g by mouth four (4) times daily. cyanocobalamin 500 mcg tablet Commonly known as:  VITAMIN B12 Take 500 mcg by mouth daily. diclofenac 1 % Gel Commonly known as:  VOLTAREN Apply  to affected area four (4) times daily. gabapentin 100 mg capsule Commonly known as:  NEURONTIN Take  by mouth three (3) times daily. * HYDROcodone-acetaminophen  mg tablet Commonly known as:  Ana Rosanetta Thomas Take 0.5-1 Tabs by mouth two (2) times daily as needed for Pain for up to 30 days. Max Daily Amount: 2 Tabs. Start taking on:  8/9/2018  
  
 * HYDROcodone-acetaminophen  mg tablet Commonly known as:  Ana Rosanetta Thomas Take 0.5-1 Tabs by mouth two (2) times daily as needed for Pain for up to 30 days. Max Daily Amount: 2 Tabs. Start taking on:  9/8/2018  
  
 * HYDROcodone-acetaminophen  mg tablet Commonly known as:  Ana Rosanetta Thomas Take 0.5-1 Tabs by mouth two (2) times daily as needed for Pain for up to 30 days. Max Daily Amount: 2 Tabs. Start taking on:  10/7/2018  
  
 ibuprofen 800 mg tablet Commonly known as:  MOTRIN Take  by mouth every eight (8) hours as needed for Pain. lisinopril-hydroCHLOROthiazide 10-12.5 mg per tablet Commonly known as:  Camella Rasp Take  by mouth daily. naloxone 2 mg/0.4 mL auto-injector Commonly known as:  EVZIO  
0.4 mL by IntraMUSCular route once as needed for Overdose for up to 1 dose. Indications: OPIATE-INDUCED RESPIRATORY DEPRESSION  
  
 omeprazole 20 mg capsule Commonly known as:  PRILOSEC  
 Take 20 mg by mouth two (2) times a day. OTHER  
DDS Lumbar Traction Belt OTHER Safe Step walk-in tub TENS Units Bhargav Gauze Use as directed  
  
 tiZANidine 4 mg capsule Commonly known as:  Ijeoma Reagin Take 4 mg by mouth two (2) times a day. traMADol 50 mg tablet Commonly known as:  ULTRAM  
Take 50 mg by mouth two (2) times a day. * Notice: This list has 3 medication(s) that are the same as other medications prescribed for you. Read the directions carefully, and ask your doctor or other care provider to review them with you. Prescriptions Printed Refills HYDROcodone-acetaminophen (NORCO)  mg tablet 0 Starting on: 2018 Sig: Take 0.5-1 Tabs by mouth two (2) times daily as needed for Pain for up to 30 days. Max Daily Amount: 2 Tabs. Class: Print Route: Oral  
 HYDROcodone-acetaminophen (NORCO)  mg tablet 0 Starting on: 2018 Sig: Take 0.5-1 Tabs by mouth two (2) times daily as needed for Pain for up to 30 days. Max Daily Amount: 2 Tabs. Class: Print Route: Oral  
 HYDROcodone-acetaminophen (NORCO)  mg tablet 0 Starting on: 10/7/2018 Sig: Take 0.5-1 Tabs by mouth two (2) times daily as needed for Pain for up to 30 days. Max Daily Amount: 2 Tabs. Class: Print Route: Oral  
  
Prescriptions Sent to Pharmacy Refills  
 naloxone (EVZIO) 2 mg/0.4 mL auto-injector 1 Si.4 mL by IntraMUSCular route once as needed for Overdose for up to 1 dose. Indications: OPIATE-INDUCED RESPIRATORY DEPRESSION Class: Normal  
 Pharmacy: [address] [place] [address] #38 - [place], [address] [place] [address] #: [phone] Route: IntraMUSCular We Performed the Following AMB POC DRUG SCREEN () [ [facility]] DRUG SCREEN [[id number] Custom] Follow-up Instructions Return in about 3 months (around 10/30/2018). Patient Instructions 1. Continue current plan with no evidence of addiction or diversion. Stable on current medication without adverse events. 2. Refill  hydrocodone 10/325 mg. Please take half to 1 tablet up to 2 times daily as needed. 3. Continue tramadol 50 mg  12 tablets up to 3 times daily as needed for pain. 2 refills remaining. Plan to taper next visit. 4. Add naloxone nasal spray for opioid induced respiratory depression emergency only. Extensive counseling was provided regarding this medication. 5. Discussed risks of addiction, dependency, and opioid induced hyperalgesia. Please remember to call 7 days prior to your medication refill. Return to clinic in 3 months. Please call and cancel your appointment and pain management agreement if you do decide to transfer your care. Introducing [facility] SERVICES! Percy Padilla introduces [employer] patient portal. Now you can access parts of your medical record, email your doctor's office, and request medication refills online. 1. In your internet browser, go to [email or web address] 2. Click on the First Time User? Click Here link in the Sign In box. You will see the New Member Sign Up page. 3. Enter your [employer] Access Code exactly as it appears below. You will not need to use this code after youve completed the sign-up process. If you do not sign up before the expiration date, you must request a new code. · [employer] Access Code: [email or web address] Expires: 10/28/2018  8:51 AM 
 
4. Enter the last four digits of your Social Security Number (xxxx) and Date of Birth (mm/dd/yyyy) as indicated and click Submit. You will be taken to the next sign-up page. 5. Create a [employer] ID. This will be your [employer] login ID and cannot be changed, so think of one that is secure and easy to remember. 6. Create a [employer] password. You can change your password at any time. 7. Enter your Password Reset Question and Answer.  This can be used at a later time if you forget your password. 8. Enter your e-mail address. You will receive e-mail notification when new information is available in [address] 9. Click Sign Up. You can now view and download portions of your medical record. 10. Click the Download Summary menu link to download a portable copy of your medical information. If you have questions, please visit the Frequently Asked Questions section of the [employer] website. Remember, [employer] is NOT to be used for urgent needs. For medical emergencies, dial 911. Now available from your iPhone and Android! Please provide this summary of care documentation to your next provider. Your primary care clinician is listed as Yolanda High. If you have any questions after today's visit, please call [phone].
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
## 2018-08-31 ENCOUNTER — TELEPHONE (OUTPATIENT)
Dept: PAIN MANAGEMENT | Age: 68
End: 2018-08-31

## 2018-08-31 DIAGNOSIS — G89.29 CHRONIC BILATERAL LOW BACK PAIN WITH SCIATICA, SCIATICA LATERALITY UNSPECIFIED: ICD-10-CM

## 2018-08-31 DIAGNOSIS — G89.4 CHRONIC PAIN SYNDROME: Primary | ICD-10-CM

## 2018-08-31 DIAGNOSIS — M54.40 CHRONIC BILATERAL LOW BACK PAIN WITH SCIATICA, SCIATICA LATERALITY UNSPECIFIED: ICD-10-CM

## 2018-08-31 RX ORDER — HYDROCODONE BITARTRATE AND ACETAMINOPHEN 10; 325 MG/1; MG/1
.5-1 TABLET ORAL
Qty: 60 TAB | Refills: 0 | Status: SHIPPED | OUTPATIENT
Start: 2018-09-01 | End: 2018-10-01

## 2018-08-31 NOTE — TELEPHONE ENCOUNTER
Patient requested medication refill [id number] 9:57am    1. ,name verified  2. Medicine requested - norco  3.  [phone]  4. Percentage of pain relief while taking medicine - 70  5. ADL - yes  6.   Any side effects while taking medicatin - none

## 2018-08-31 NOTE — TELEPHONE ENCOUNTER
Jennifer Norman  has called requesting a refill of their controlled medication Norco  for the management of chronic bilateral low back pain with sciatica     Last office visit date: 07/30/18    Date last  was pulled and reviewed : 08/31/18    Was the patient compliant when the above report was pulled? yes    Analgesia: The patient states that he receives  70% of pain relief from the medication     Aberrancies: There are no aberrancies noted at this time     ADL's: The patient states that he is able to perform his adls while on medications     Adverse Reaction: There are no adverse reactions noted at this time     Provider's last note and plan of care reviewed? Yes     I will send this to Dr Veronica Goss for him to print the correct date on the script     Request forwarded to provider for review.

## 2018-09-28 ENCOUNTER — TELEPHONE (OUTPATIENT)
Dept: PAIN MANAGEMENT | Age: 68
End: 2018-09-28

## 2018-09-28 NOTE — TELEPHONE ENCOUNTER
Monica Recinos Sr has called requesting a refill of their controlled medication Norco  for the management of closed fracture of lumbar vertebra     Last office visit date: 07/30/18, next visit 10/25/18    Date last  was pulled and reviewed : 09/28/18    Was the patient compliant when the above report was pulled? yes    Analgesia: The patient states that he receives 70% of pain relief from the medication    Aberrancies: There are no aberrancies noted at this time     ADL's: The patient states that he is able to perform his adls while on the medication     Adverse Reaction: There are no adverse reactions noted at this time    Provider's last note and plan of care reviewed? yes  Request forwarded to provider for review.

## 2018-10-01 NOTE — TELEPHONE ENCOUNTER
Patient identified using two patient identifiers Mau Schmitt Sr 1950 was called and informed that his script is ready for . The patient verbalized understanding of the phone call.

## 2018-10-25 ENCOUNTER — OFFICE VISIT (OUTPATIENT)
Dept: PAIN MANAGEMENT | Age: 68
End: 2018-10-25

## 2018-10-25 VITALS
DIASTOLIC BLOOD PRESSURE: 69 MMHG | WEIGHT: 300 LBS | HEIGHT: 73 IN | RESPIRATION RATE: 14 BRPM | TEMPERATURE: 96.9 F | BODY MASS INDEX: 39.76 KG/M2 | HEART RATE: 69 BPM | SYSTOLIC BLOOD PRESSURE: 113 MMHG

## 2018-10-25 DIAGNOSIS — M54.2 CERVICALGIA: Primary | ICD-10-CM

## 2018-10-25 DIAGNOSIS — Z79.899 ENCOUNTER FOR LONG-TERM (CURRENT) USE OF HIGH-RISK MEDICATION: ICD-10-CM

## 2018-10-25 DIAGNOSIS — G89.29 CHRONIC MIDLINE LOW BACK PAIN, WITH SCIATICA PRESENCE UNSPECIFIED: ICD-10-CM

## 2018-10-25 DIAGNOSIS — G56.20 ULNAR NEUROPATHY AT ELBOW, UNSPECIFIED LATERALITY: ICD-10-CM

## 2018-10-25 DIAGNOSIS — M54.5 CHRONIC MIDLINE LOW BACK PAIN, WITH SCIATICA PRESENCE UNSPECIFIED: ICD-10-CM

## 2018-10-25 RX ORDER — NALOXONE HYDROCHLORIDE 4 MG/.1ML
SPRAY NASAL
Qty: 1 EACH | Refills: 0 | Status: SHIPPED | OUTPATIENT
Start: 2018-10-25

## 2018-10-25 RX ORDER — SENNOSIDES 8.6 MG/1
1 TABLET ORAL
Qty: 30 TAB | Refills: 2 | Status: SHIPPED | OUTPATIENT
Start: 2018-10-25

## 2018-10-25 RX ORDER — DOCUSATE SODIUM 100 MG/1
100 CAPSULE, LIQUID FILLED ORAL
Qty: 60 CAP | Refills: 2 | Status: SHIPPED | OUTPATIENT
Start: 2018-10-25 | End: 2019-01-23

## 2018-10-25 RX ORDER — HYDROCODONE BITARTRATE AND ACETAMINOPHEN 5; 325 MG/1; MG/1
1 TABLET ORAL
Qty: 90 TAB | Refills: 0 | Status: SHIPPED | OUTPATIENT
Start: 2018-10-25 | End: 2018-11-24

## 2018-10-25 RX ORDER — TRAMADOL HYDROCHLORIDE 50 MG/1
50 TABLET ORAL
Qty: 120 TAB | Refills: 0 | Status: SHIPPED | OUTPATIENT
Start: 2018-10-25
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
## 2018-10-25 NOTE — PATIENT INSTRUCTIONS
Learning About Benefits From Quitting Smoking How does quitting smoking make you healthier? If you're thinking about quitting smoking, you may have a few reasons to be smoke-free. Your health may be one of them. · When you quit smoking, you lower your risks for cancer, lung disease, heart attack, stroke, blood vessel disease, and blindness from macular degeneration. · When you're smoke-free, you get sick less often, and you heal faster. You are less likely to get colds, flu, bronchitis, and pneumonia. · As a nonsmoker, you may find that your mood is better and you are less stressed. When and how will you feel healthier? Quitting has real health benefits that start from day 1 of being smoke-free. And the longer you stay smoke-free, the healthier you get and the better you feel. The first hours · After just 20 minutes, your blood pressure and heart rate go down. That means there's less stress on your heart and blood vessels. · Within 12 hours, the level of carbon monoxide in your blood drops back to normal. That makes room for more oxygen. With more oxygen in your body, you may notice that you have more energy than when you smoked. After 2 weeks · Your lungs start to work better. · Your risk of heart attack starts to drop. After 1 month · When your lungs are clear, you cough less and breathe deeper, so it's easier to be active. · Your sense of taste and smell return. That means you can enjoy food more than you have since you started smoking. Over the years · After 1 year, your risk of heart disease is half what it would be if you kept smoking. · After 5 years, your risk of stroke starts to shrink. Within a few years after that, it's about the same as if you'd never smoked. · After 10 years, your risk of dying from lung cancer is cut by about half. And your risk for many other types of cancer is lower too. How would quitting help others in your life? When you quit smoking, you improve the health of everyone who now breathes in your smoke. · Their heart, lung, and cancer risks drop, much like yours. · They are sick less. For babies and small children, living smoke-free means they're less likely to have ear infections, pneumonia, and bronchitis. · If you're a woman who is or will be pregnant someday, quitting smoking means a healthier . · Children who are close to you are less likely to become adult smokers. Where can you learn more? Go to http://jonathan-hany.info/. Enter [id number] [id number] [id number] in the search box to learn more about \"Learning About Benefits From Quitting Smoking. \" Current as of: 2017 Content Version: 11.8 © 8954-5032 [employer]. Care instructions adapted under license by [employer] (which disclaims liability or warranty for this information). If you have questions about a medical condition or this instruction, always ask your healthcare professional. [place] [address] any warranty or liability for your use of this information. Safe Use of Opioid Pain Medicine: Care Instructions Your Care Instructions Pain is your body's way of warning you that something is wrong. Pain feels different for everybody. Only you can describe your pain. A doctor can suggest or prescribe many types of medicines for pain. These range from over-the-counter medicines like acetaminophen (Tylenol) to powerful medicines called opioids. Examples of opioids are fentanyl, hydrocodone, morphine, and oxycodone. Heroin is an illegal opioid Opioids are strong medicines. They can help you manage pain when you use them the right way. But if you misuse them, they can cause serious harm and even death. For these reasons, doctors are very careful about how they prescribe opioids. If you decide to take opioids, here are some things to remember. · Keep your doctor informed. You can get addicted to opioids. The risk is higher if you have a history of substance use. Your doctor will monitor you closely for signs of misuse and addiction and to figure out when you no longer need to take opioids. · Make a treatment plan. The goal of your plan is to be able to function and do the things you need to do, even if you still have some pain. You might be able to manage your pain with other non-opioid options like physical therapy, relaxation, or over-the-counter pain medicines. · Be aware of the side effects. Opioids can cause serious side effects, such as constipation, dry mouth, and nausea. And over time, you may need a higher dose to get pain relief. This is called tolerance. Your body also gets used to opioids. This is called physical dependence. If you suddenly stop taking them, you may have withdrawal symptoms. The doctor carefully considered what pain medicine is right for you. You may not have received opioids if your doctor was concerned about drug interactions or your safety, or if he or she had other concerns. It is best to have one doctor or clinic treat your pain. This way you will get the pain medicine that will help you the most. And a doctor will be able to watch for any problems that the medicine might cause. The doctor has checked you carefully, but problems can develop later. If you notice any problems or new symptoms,  get medical treatment right away. Follow-up care is a key part of your treatment and safety. Be sure to make and go to all appointments, and call your doctor if you are having problems. It's also a good idea to know your test results and keep a list of the medicines you take. How can you care for yourself at home? · If you need to take opioids to manage your pain, remember these safety tips. ? Follow directions carefully.  It's easy to misuse opioids if you take a dose other than what's prescribed by your doctor. This can lead to overdose and even death. Even sharing them with someone they weren't meant for is misuse. ? Be cautious. Opioids may affect your judgment and decision making. Do not drive or operate machinery until you can think clearly. Talk with your doctor about when it is safe to drive. ? Reduce the risk of drug interactions. Opioids can be dangerous if you take them with alcohol or with certain drugs like sleeping pills and muscle relaxers. Make sure your doctor knows about all the other medicines you take, including over-the-counter medicines. Don't start any new medicines before you talk to your doctor or pharmacist. 
? Keep others safe. Store opioids in a safe and secure place. Make sure that pets, children, friends, and family can't get to them. When you're done using opioids, make sure to properly dispose of them. You can either use a community drug take-back program or your drugstore's mail-back program. If one of these programs isn't available, you can flush opioid skin patches and unused opioid pills down the toilet. ? Reduce the risk of overdose. Misuse of opioids can be very dangerous. Protect yourself by asking your doctor about a naloxone rescue kit. It can help youand even save your lifeif you take too much of an opioid. · Try other ways to reduce pain. ? Relax, and reduce stress. Relaxation techniques such as deep breathing or meditation can help. ? Keep moving. Gentle, daily exercise can help reduce pain over the long run. Try low- or no-impact exercises such as walking, swimming, and stationary biking. Do stretches to stay flexible. ? Try heat, cold packs, and massage. ? Get enough sleep. Pain can make you tired and drain your energy. Talk with your doctor if you have trouble sleeping because of pain. ? Think positive. Your thoughts can affect your pain level.  Do things that you enjoy to distract yourself when you have pain instead of focusing on the pain. See a movie, read a book, listen to music, or spend time with a friend. · If you are not taking a prescription pain medicine, ask your doctor if you can take an over-the-counter medicine. When should you call for help? Call your doctor now or seek immediate medical care if: 
  · You have a new kind of pain.  
  · You have new symptoms, such as a fever or rash, along with the pain.  
 Watch closely for changes in your health, and be sure to contact your doctor if: 
  · You think you might be using too much pain medicine, and you need help to use less or stop.  
  · Your pain gets worse.  
  · You would like a referral to a doctor or clinic that specializes in pain management. Where can you learn more? Go to http://jonathan-hany.info/. Enter R108 in the search box to learn more about \"Safe Use of Opioid Pain Medicine: Care Instructions. \" Current as of: September 10, 2017 Content Version: 11.8 © 2931-9938 [employer]. Care instructions adapted under license by [employer] (which disclaims liability or warranty for this information). If you have questions about a medical condition or this instruction, always ask your healthcare professional. [address] any warranty or liability for your use of this information.

## 2018-10-25 NOTE — PROGRESS NOTES
Nursing Notes Patient presents to the office today in follow-up. Patient rates his pain at 6/10 on the numerical pain scale. Reviewed medications with counts as follows:   
Rx Date filled Qty Dispensed Pill Count Last Dose Short  
norco 10/325 mg 10/07/18 60 29 today no Tramadol 50 mg  10/05/18 180 95 today no Last opioid agreement 10/25/18 Last urine drug screen 07/30/18 Comments: POC UDS was not performed in office today. Any new labs or imaging since last appointment? YES. Pt had some testing done while he was in the ER. Have you been to an emergency room (ER) or urgent care clinic since your last visit? YES. Pt went to the ER at [facility] for chest pain. Have you been hospitalized since your last visit? NO If yes, where, when, and reason for visit? Have you seen or consulted any other health care providers outside of the [address] [place] [address]  since your last visit? YES If yes, where, when, and reason for visit? Pt went to [facility] pain management one time and he did not like the office. Mr. Quinton Bedoya has a reminder for a \"due or due soon\" health maintenance. I have asked that he contact his primary care provider for follow-up on this health maintenance. PHQ over the last two weeks 10/25/2018 Little interest or pleasure in doing things Not at all Feeling down, depressed, irritable, or hopeless Not at all Total Score PHQ 2 0 Trouble falling or staying asleep, or sleeping too much Not at all Feeling tired or having little energy Several days Poor appetite, weight loss, or overeating Not at all Feeling bad about yourself - or that you are a failure or have let yourself or your family down Not at all Trouble concentrating on things such as school, work, reading, or watching TV Not at all Moving or speaking so slowly that other people could have noticed; or the opposite being so fidgety that others notice Not at all Thoughts of being better off dead, or hurting yourself in some way Not at all PHQ 9 Score 1 How difficult have these problems made it for you to do your work, take care of your home and get along with others Somewhat difficult

## 2018-11-26 ENCOUNTER — DOCUMENTATION ONLY (OUTPATIENT)
Dept: PAIN MANAGEMENT | Age: 68
End: 2018-11-26

## 2022-03-09 NOTE — PATIENT INSTRUCTIONS
1. Continue current plan with no evidence of addiction or diversion. Stable on current medication without adverse events. 2. Refill and adjust hydrocodone 10/325 mg. Please take half to 1 tablet up to 2 times daily as needed. 3. Refill tramadol 50 mg  1-2 tablets up to 3 times daily as needed for pain. 3 refills  4. Please contact your PCP as soon as possible to get your blood pressure under better control. 5. Discussed risks of addiction, dependency, and opioid induced hyperalgesia. 6. Please remember to call at least 3-4 business days prior to your medication refill. 7. Return to clinic in 3 months. Please remember to call and cancel your appointment and pain management agreement if you do decide to transition her care. not examined